# Patient Record
Sex: FEMALE | Race: WHITE | NOT HISPANIC OR LATINO | Employment: FULL TIME | URBAN - METROPOLITAN AREA
[De-identification: names, ages, dates, MRNs, and addresses within clinical notes are randomized per-mention and may not be internally consistent; named-entity substitution may affect disease eponyms.]

---

## 2017-04-26 ENCOUNTER — OFFICE VISIT (OUTPATIENT)
Dept: ENDOCRINOLOGY | Age: 32
End: 2017-04-26

## 2017-04-26 VITALS
WEIGHT: 210.6 LBS | DIASTOLIC BLOOD PRESSURE: 64 MMHG | HEART RATE: 88 BPM | HEIGHT: 64 IN | OXYGEN SATURATION: 97 % | BODY MASS INDEX: 35.96 KG/M2 | SYSTOLIC BLOOD PRESSURE: 122 MMHG

## 2017-04-26 DIAGNOSIS — R52 TOTAL BODY PAIN: ICD-10-CM

## 2017-04-26 DIAGNOSIS — E55.9 VITAMIN D DEFICIENCY: Primary | ICD-10-CM

## 2017-04-26 DIAGNOSIS — R53.82 CHRONIC FATIGUE: ICD-10-CM

## 2017-04-26 DIAGNOSIS — E06.3 HASHIMOTO'S THYROIDITIS: ICD-10-CM

## 2017-04-26 DIAGNOSIS — E55.9 VITAMIN D DEFICIENCY: ICD-10-CM

## 2017-04-26 DIAGNOSIS — Z83.3 FAMILY HISTORY OF DIABETES MELLITUS (DM): ICD-10-CM

## 2017-04-26 DIAGNOSIS — E03.9 PRIMARY HYPOTHYROIDISM: Primary | ICD-10-CM

## 2017-04-26 PROCEDURE — 99204 OFFICE O/P NEW MOD 45 MIN: CPT | Performed by: INTERNAL MEDICINE

## 2017-04-26 RX ORDER — LEVOTHYROXINE SODIUM 112 UG/1
TABLET ORAL
Refills: 5 | COMMUNITY
Start: 2017-04-10 | End: 2017-04-26

## 2017-04-26 RX ORDER — BUPROPION HYDROCHLORIDE 300 MG/1
TABLET ORAL
Refills: 2 | COMMUNITY
Start: 2017-04-10

## 2017-04-26 RX ORDER — LEVOTHYROXINE SODIUM 137 MCG
137 TABLET ORAL DAILY
Qty: 30 TABLET | Refills: 5 | Status: SHIPPED | OUTPATIENT
Start: 2017-04-26 | End: 2017-08-30 | Stop reason: SDUPTHER

## 2017-04-26 RX ORDER — BUPROPION HYDROCHLORIDE 150 MG/1
TABLET ORAL
Refills: 0 | COMMUNITY
Start: 2017-02-05 | End: 2017-04-26

## 2017-04-26 RX ORDER — BUPROPION HYDROCHLORIDE 200 MG/1
TABLET, EXTENDED RELEASE ORAL
Refills: 1 | COMMUNITY
Start: 2017-03-23 | End: 2017-04-26

## 2017-04-26 RX ORDER — LIOTHYRONINE SODIUM 5 UG/1
5 TABLET ORAL 2 TIMES DAILY
Qty: 60 TABLET | Refills: 5 | Status: SHIPPED | OUTPATIENT
Start: 2017-04-26 | End: 2017-08-30 | Stop reason: SDUPTHER

## 2017-04-26 RX ORDER — LIOTHYRONINE SODIUM 5 UG/1
TABLET ORAL
Refills: 1 | COMMUNITY
Start: 2017-04-09 | End: 2017-04-26 | Stop reason: SDUPTHER

## 2017-04-26 RX ORDER — TRAZODONE HYDROCHLORIDE 100 MG/1
TABLET ORAL
Refills: 1 | COMMUNITY
Start: 2017-04-06 | End: 2019-10-29

## 2017-04-26 NOTE — PROGRESS NOTES
Subjective this is a 32-year-old female known patient with Hashimoto thyroiditis and hypothyroidism is being seen for further evaluation and treatment of this condition.  She is currently on levothyroxin 112 µg daily and Cytomel 5 mg in the morning and 20 half milligram in the evening.  She has a very strongly positive the history of hypothyroidism and autoimmune thyroiditis in her mother and the aunt and grandmother.  She also has history of diabetes in her side of her father.  She is single and has not been pregnant by choice.  Since about 14 months ago she has lost about 40 pounds a weight primarily by adhering to an more strict diet and more intense exercise.  She is complaining of being tired primarily in the early afternoon.  She on further questioning she admits to the fact that her periods are rather irregular.  Vivian Brandt is a 32 y.o. female.     HPI Comments: New pt  Ref by dr Eris Ventura  For hashimoto's thyroiditis     Hashimoto's Thyroiditis   Associated symptoms include headaches and joint swelling ( pelvic region ).        The following portions of the patient's history were reviewed and updated as appropriate: allergies, current medications, past family history, past medical history, past social history, past surgical history and problem list.    Review of Systems   Constitutional: Negative.    Eyes: Negative.    Respiratory: Negative.    Cardiovascular: Negative.    Gastrointestinal: Negative.    Endocrine: Negative.    Genitourinary: Negative.    Musculoskeletal: Positive for joint swelling ( pelvic region ).   Skin: Negative.    Allergic/Immunologic: Negative.    Neurological: Positive for headaches.   Hematological: Negative.    Psychiatric/Behavioral: The patient is nervous/anxious.        Objective   Physical Exam   Constitutional: She is oriented to person, place, and time. She appears well-developed and well-nourished. No distress.   HENT:   Head: Normocephalic and atraumatic.   Right  Ear: External ear normal.   Left Ear: External ear normal.   Nose: Nose normal.   Mouth/Throat: Oropharynx is clear and moist. No oropharyngeal exudate.   Eyes: Conjunctivae and EOM are normal. Pupils are equal, round, and reactive to light. Right eye exhibits no discharge. Left eye exhibits no discharge. No scleral icterus.   Neck: Normal range of motion. Neck supple. No JVD present. No tracheal deviation present. No thyromegaly present.   Cardiovascular: Normal rate, regular rhythm, normal heart sounds and intact distal pulses.  Exam reveals no gallop and no friction rub.    No murmur heard.  Pulmonary/Chest: Effort normal and breath sounds normal. No stridor. No respiratory distress. She has no wheezes. She has no rales. She exhibits no tenderness.   Abdominal: Soft. Bowel sounds are normal. She exhibits no distension and no mass. There is no tenderness. There is no rebound and no guarding. No hernia.   Musculoskeletal: Normal range of motion. She exhibits no edema, tenderness or deformity.   Lymphadenopathy:     She has no cervical adenopathy.   Neurological: She is alert and oriented to person, place, and time. She has normal reflexes. She displays normal reflexes. No cranial nerve deficit. She exhibits normal muscle tone. Coordination normal.   Skin: Skin is warm and dry. No rash noted. She is not diaphoretic. No erythema. No pallor.   Psychiatric: She has a normal mood and affect. Her behavior is normal. Judgment and thought content normal.   Nursing note and vitals reviewed.    Admission on 09/29/2015, Discharged on 09/30/2015   Component Date Value Ref Range Status   • WBC 09/29/2015 12.81* 3.50 - 10.80 K/mcL Final   • RBC 09/29/2015 4.69  3.89 - 5.14 M/mcL Final   • Hemoglobin 09/29/2015 12.8  11.5 - 15.5 g/dL Final   • Hematocrit 09/29/2015 40.5  34.5 - 44.0 % Final   • MCV 09/29/2015 86.4  80.0 - 99.0 fL Final   • MCH 09/29/2015 27.3  27.0 - 31.0 pg Final   • MCHC 09/29/2015 31.6* 32.0 - 36.0 g/dL Final    • RDW-CV 09/29/2015 13.4  11.3 - 14.5 % Final   • Platelets 09/29/2015 277  150 - 450 K/mcL Final   • Neutrophils Absolute 09/29/2015 9.31* 1.50 - 8.30 K/mcL Final   • Lymphocytes Absolute 09/29/2015 2.70  0.60 - 4.80 K/mcL Final   • Monocytes Absolute 09/29/2015 0.45  0.00 - 1.00 K/mcL Final   • Eosinophils Absolute 09/29/2015 0.25  0.10 - 0.30 K/mcL Final   • Basophils Absolute 09/29/2015 0.05  0.00 - 0.20 K/mcL Final   • nRBC 09/29/2015 0.0   Final   • Neutrophil Rel % 09/29/2015 72.6* 41.0 - 71.0 % Final   • Lymphocyte Rel % 09/29/2015 21.1* 24.0 - 44.0 % Final   • Monocyte Rel % 09/29/2015 3.5  0.0 - 12.0 % Final   • Eosinophil Rel % 09/29/2015 2.0  0.0 - 3.0 % Final   • Basophil Rel % 09/29/2015 0.4  0.0 - 1.0 % Final   • Immature Granulocyte Rel % 09/29/2015 0.4  0.0 - 0.6 % Final   • TSH 09/29/2015 1.106  0.350 - 5.350 UIU/mL Final    Comment: Specimens containing fluorescein can produce falsely depressed values  with this assay.  Patients undergoing fluorescein dye angiography should  wait 72 hours post-treatment before testing.     • Glucose 09/29/2015 86  70 - 100 mg/dL Final   • BUN 09/29/2015 14  9 - 23 mg/dL Final   • Creatinine 09/29/2015 0.8  0.6 - 1.3 mg/dL Final   • Sodium 09/29/2015 140  132 - 146 mmol/L Final   • Potassium 09/29/2015 3.8  3.5 - 5.5 mmol/L Final   • Chloride 09/29/2015 106  99 - 109 mmol/L Final   • CO2 09/29/2015 16* 20 - 31 mmol/L Final   • Calcium 09/29/2015 9.2  8.7 - 10.4 mg/dL Final   • Alkaline Phosphatase 09/29/2015 99  25 - 100 Units/L Final   • AST (SGOT) 09/29/2015 15  0 - 33 Units/L Final   • ALT (SGPT) 09/29/2015 11  7 - 40 Units/L Final   • Total Bilirubin 09/29/2015 0.2* 0.3 - 1.2 mg/dL Final   • Total Protein 09/29/2015 7.8  5.7 - 8.2 g/dL Final   • Albumin 09/29/2015 4.5  3.2 - 4.8 g/dL Final   • eGFR 09/29/2015 91  ml/min/1.732 Final    Comment:    [employer] Guidelines          Stage         Description                    GFR        1                 Normal or High               90+        2                Mild decrease                 60-89        3                Moderate decrease        30-59        4                Severe decrease            15-29        5                Kidney failure                  <15         • Anion Gap 09/29/2015 18* 3 - 11 mmol/L Final     Assessment/Plan   Vivian was seen today for hashimoto's thyroiditis.    Diagnoses and all orders for this visit:    Primary hypothyroidism  -     T3, Free  -     T4 & TSH ([facility])  -     T4, Free  -     Thyroglobulin With Anti-TG  -     Uric Acid  -     Vitamin D 25 Hydroxy  -     Comprehensive Metabolic Panel  -     Follicle Stimulating Hormone  -     Lipid Panel  -     Luteinizing Hormone  -     Prolactin  -     ACTH  -     Cortisol  -     C-Peptide  -     Hemoglobin A1c  -     T3, Free; Future  -     T4 & TSH ([facility]); Future  -     T4, Free; Future  -     Thyroglobulin With Anti-TG; Future  -     Comprehensive Metabolic Panel; Future  -     Vitamin D 25 Hydroxy; Future  -     Uric Acid; Future  -     Lipid Panel; Future    Hashimoto's thyroiditis  -     T3, Free  -     T4 & TSH ([facility])  -     T4, Free  -     Thyroglobulin With Anti-TG  -     Uric Acid  -     Vitamin D 25 Hydroxy  -     Comprehensive Metabolic Panel  -     Follicle Stimulating Hormone  -     Lipid Panel  -     Luteinizing Hormone  -     Prolactin  -     ACTH  -     Cortisol  -     C-Peptide  -     Hemoglobin A1c  -     T3, Free; Future  -     T4 & TSH ([facility]); Future  -     T4, Free; Future  -     Thyroglobulin With Anti-TG; Future  -     Comprehensive Metabolic Panel; Future  -     Vitamin D 25 Hydroxy; Future  -     Uric Acid; Future  -     Lipid Panel; Future    Chronic fatigue  -     T3, Free  -     T4 & TSH ([facility])  -     T4, Free  -     Thyroglobulin With Anti-TG  -     Uric Acid  -     Vitamin D 25 Hydroxy  -     Comprehensive Metabolic Panel  -     Follicle Stimulating Hormone  -     Lipid Panel  -      Luteinizing Hormone  -     Prolactin  -     ACTH  -     Cortisol  -     C-Peptide  -     Hemoglobin A1c  -     T3, Free; Future  -     T4 & TSH ([facility]); Future  -     T4, Free; Future  -     Thyroglobulin With Anti-TG; Future  -     Comprehensive Metabolic Panel; Future  -     Vitamin D 25 Hydroxy; Future  -     Uric Acid; Future  -     Lipid Panel; Future    Total body pain  -     T3, Free  -     T4 & TSH ([facility])  -     T4, Free  -     Thyroglobulin With Anti-TG  -     Uric Acid  -     Vitamin D 25 Hydroxy  -     Comprehensive Metabolic Panel  -     Follicle Stimulating Hormone  -     Lipid Panel  -     Luteinizing Hormone  -     Prolactin  -     ACTH  -     Cortisol  -     C-Peptide  -     Hemoglobin A1c  -     T3, Free; Future  -     T4 & TSH ([facility]); Future  -     T4, Free; Future  -     Thyroglobulin With Anti-TG; Future  -     Comprehensive Metabolic Panel; Future  -     Vitamin D 25 Hydroxy; Future  -     Uric Acid; Future  -     Lipid Panel; Future    Vitamin D deficiency  -     T3, Free  -     T4 & TSH ([facility])  -     T4, Free  -     Thyroglobulin With Anti-TG  -     Uric Acid  -     Vitamin D 25 Hydroxy  -     Comprehensive Metabolic Panel  -     Follicle Stimulating Hormone  -     Lipid Panel  -     Luteinizing Hormone  -     Prolactin  -     ACTH  -     Cortisol  -     C-Peptide  -     Hemoglobin A1c  -     T3, Free; Future  -     T4 & TSH ([facility]); Future  -     T4, Free; Future  -     Thyroglobulin With Anti-TG; Future  -     Comprehensive Metabolic Panel; Future  -     Vitamin D 25 Hydroxy; Future  -     Uric Acid; Future  -     Lipid Panel; Future    Family history of diabetes mellitus (DM)  -     C-Peptide  -     Hemoglobin A1c  -     T3, Free; Future  -     T4 & TSH ([facility]); Future  -     T4, Free; Future  -     Thyroglobulin With Anti-TG; Future  -     Comprehensive Metabolic Panel; Future  -     Vitamin D 25 Hydroxy; Future  -     Uric Acid; Future  -     Lipid Panel; Future    Other  orders  -     SYNTHROID 137 MCG tablet; Take 1 tablet by mouth Daily. On empty stomach  -     liothyronine (CYTOMEL) 5 MCG tablet; Take 1 tablet by mouth 2 (Two) Times a Day.             in summary I saw and examined this 32-year-old female for above-mentioned problems.  I reviewed her primary care's office notes as well as laboratory evaluation of 08/05/2016 and at this point we will go ahead and the change her to Synthroid 137 µg per day with Cytomel 5 mg twice daily first dose to be taken in the morning and the second dose in early afternoon around 3-4 PM.  Additionally we will go ahead and order an extensive laboratory evaluation and once the results come back we will go ahead and call for any possible modifications.  She will see Ms. Lydia Haddad in 4 months or sooner if needed with laboratory evaluation prior to each office visit.

## 2017-04-27 LAB — 25(OH)D3+25(OH)D2 SERPL-MCNC: 34.3 NG/ML (ref 30–100)

## 2017-05-02 LAB
ACTH PLAS-MCNC: 25.2 PG/ML (ref 7.2–63.3)
ALBUMIN SERPL-MCNC: 4.3 G/DL (ref 3.5–5.2)
ALBUMIN/GLOB SERPL: 1.3 G/DL
ALP SERPL-CCNC: 88 U/L (ref 39–117)
ALT SERPL-CCNC: 9 U/L (ref 1–33)
AST SERPL-CCNC: 11 U/L (ref 1–32)
BILIRUB SERPL-MCNC: 0.2 MG/DL (ref 0.1–1.2)
BUN SERPL-MCNC: 15 MG/DL (ref 6–20)
BUN/CREAT SERPL: 16.7 (ref 7–25)
C PEPTIDE SERPL-MCNC: 6.5 NG/ML (ref 1.1–4.4)
CALCIUM SERPL-MCNC: 9.9 MG/DL (ref 8.6–10.5)
CHLORIDE SERPL-SCNC: 101 MMOL/L (ref 98–107)
CHOLEST SERPL-MCNC: 161 MG/DL (ref 0–200)
CO2 SERPL-SCNC: 24.5 MMOL/L (ref 22–29)
CORTIS SERPL-MCNC: 9.4 UG/DL
CREAT SERPL-MCNC: 0.9 MG/DL (ref 0.57–1)
FSH SERPL-ACNC: 7.5 MIU/ML
GLOBULIN SER CALC-MCNC: 3.2 GM/DL
GLUCOSE SERPL-MCNC: 88 MG/DL (ref 65–99)
HBA1C MFR BLD: 5 % (ref 4.8–5.6)
HDLC SERPL-MCNC: 31 MG/DL (ref 40–60)
LDLC SERPL CALC-MCNC: 101 MG/DL (ref 0–100)
LH SERPL-ACNC: 5.3 MIU/ML
POTASSIUM SERPL-SCNC: 4.3 MMOL/L (ref 3.5–5.2)
PROLACTIN SERPL-MCNC: 15.8 NG/ML (ref 4.8–23.3)
PROT SERPL-MCNC: 7.5 G/DL (ref 6–8.5)
SODIUM SERPL-SCNC: 141 MMOL/L (ref 136–145)
T3FREE SERPL-MCNC: 3.4 PG/ML (ref 2–4.4)
T4 FREE SERPL-MCNC: 1.52 NG/DL (ref 0.93–1.7)
T4 SERPL-MCNC: 9.71 MCG/DL (ref 4.5–11.7)
THYROGLOB AB SERPL-ACNC: 16.8 IU/ML (ref 0–0.9)
THYROGLOB SERPL-MCNC: 2.6 NG/ML
TRIGL SERPL-MCNC: 147 MG/DL (ref 0–150)
TSH SERPL DL<=0.005 MIU/L-ACNC: 1.07 MIU/ML (ref 0.27–4.2)
URATE SERPL-MCNC: 5.6 MG/DL (ref 2.4–5.7)
VLDLC SERPL CALC-MCNC: 29.4 MG/DL (ref 5–40)

## 2017-08-16 ENCOUNTER — RESULTS ENCOUNTER (OUTPATIENT)
Dept: ENDOCRINOLOGY | Age: 32
End: 2017-08-16

## 2017-08-16 DIAGNOSIS — R52 TOTAL BODY PAIN: ICD-10-CM

## 2017-08-16 DIAGNOSIS — E06.3 HASHIMOTO'S THYROIDITIS: ICD-10-CM

## 2017-08-16 DIAGNOSIS — R53.82 CHRONIC FATIGUE: ICD-10-CM

## 2017-08-16 DIAGNOSIS — E55.9 VITAMIN D DEFICIENCY: ICD-10-CM

## 2017-08-16 DIAGNOSIS — Z83.3 FAMILY HISTORY OF DIABETES MELLITUS (DM): ICD-10-CM

## 2017-08-16 DIAGNOSIS — E03.9 PRIMARY HYPOTHYROIDISM: ICD-10-CM

## 2017-08-30 ENCOUNTER — OFFICE VISIT (OUTPATIENT)
Dept: ENDOCRINOLOGY | Age: 32
End: 2017-08-30

## 2017-08-30 VITALS
SYSTOLIC BLOOD PRESSURE: 112 MMHG | WEIGHT: 193 LBS | BODY MASS INDEX: 32.95 KG/M2 | DIASTOLIC BLOOD PRESSURE: 72 MMHG | HEIGHT: 64 IN

## 2017-08-30 DIAGNOSIS — G47.00 INSOMNIA, UNSPECIFIED TYPE: ICD-10-CM

## 2017-08-30 DIAGNOSIS — E03.9 PRIMARY HYPOTHYROIDISM: ICD-10-CM

## 2017-08-30 DIAGNOSIS — E55.9 VITAMIN D DEFICIENCY: ICD-10-CM

## 2017-08-30 DIAGNOSIS — Z83.3 FAMILY HISTORY OF DIABETES MELLITUS (DM): ICD-10-CM

## 2017-08-30 DIAGNOSIS — E06.3 HASHIMOTO'S THYROIDITIS: Primary | ICD-10-CM

## 2017-08-30 PROCEDURE — 99214 OFFICE O/P EST MOD 30 MIN: CPT | Performed by: NURSE PRACTITIONER

## 2017-08-30 RX ORDER — LEVOTHYROXINE SODIUM 137 MCG
137 TABLET ORAL DAILY
Qty: 30 TABLET | Refills: 5 | Status: SHIPPED | OUTPATIENT
Start: 2017-08-30 | End: 2018-03-02 | Stop reason: SDUPTHER

## 2017-08-30 RX ORDER — LIOTHYRONINE SODIUM 5 UG/1
5 TABLET ORAL 2 TIMES DAILY
Qty: 60 TABLET | Refills: 5 | Status: SHIPPED | OUTPATIENT
Start: 2017-08-30 | End: 2018-04-18 | Stop reason: SDUPTHER

## 2017-08-30 RX ORDER — ERGOCALCIFEROL 1.25 MG/1
50000 CAPSULE ORAL WEEKLY
Qty: 12 CAPSULE | Refills: 3 | Status: SHIPPED | OUTPATIENT
Start: 2017-08-30 | End: 2017-12-27 | Stop reason: SDUPTHER

## 2017-08-30 NOTE — PROGRESS NOTES
Subjective   Vivian Brandt is a 32 y.o. female is here today for follow-up.    Hypothyroidism   This is a chronic problem. The current episode started more than 1 month ago. The problem occurs constantly. Pertinent negatives include no chest pain, coughing, fatigue, headaches, joint swelling, myalgias, nausea, rash or weakness. Nothing aggravates the symptoms. Treatments tried: medications. The treatment provided no relief.   Insomnia   This is a chronic problem. The current episode started more than 1 year ago. The problem occurs constantly. The problem has been unchanged. Pertinent negatives include no chest pain, coughing, fatigue, headaches, joint swelling, myalgias, nausea, rash or weakness. Nothing aggravates the symptoms. Treatments tried: medication - trazadone. The treatment provided no relief.       The following portions of the patient's history were reviewed and updated as appropriate: current medications, past family history, past medical history, past social history, past surgical history and problem list.       Current Outpatient Prescriptions:   •  buPROPion XL (WELLBUTRIN XL) 300 MG 24 hr tablet, TAKE 1 TABLET BY MOUTH EVERY DAY, Disp: , Rfl: 2  •  liothyronine (CYTOMEL) 5 MCG tablet, Take 1 tablet by mouth 2 (Two) Times a Day., Disp: 60 tablet, Rfl: 5  •  SYNTHROID 137 MCG tablet, Take 1 tablet by mouth Daily. On empty stomach, Disp: 30 tablet, Rfl: 5  •  traZODone (DESYREL) 100 MG tablet, TAKE 1 1/2 tablet at night, Disp: , Rfl: 1  •  vitamin D (ERGOCALCIFEROL) 66470 units capsule capsule, Take 1 capsule by mouth 1 (One) Time Per Week., Disp: 12 capsule, Rfl: 3     Patient Active Problem List   Diagnosis   • Vitamin D deficiency   • Total body pain   • Hashimoto's thyroiditis   • Chronic fatigue   • Primary hypothyroidism   • Family history of diabetes mellitus (DM)   • Insomnia       Review of Systems   Constitutional: Negative for activity change, appetite change, fatigue and unexpected weight  change.   HENT: Negative for trouble swallowing and voice change.    Eyes: Negative for visual disturbance.   Respiratory: Negative for apnea, cough, choking, chest tightness, shortness of breath and wheezing.    Cardiovascular: Negative for chest pain, palpitations and leg swelling.   Gastrointestinal: Negative for abdominal distention, constipation, diarrhea and nausea.   Endocrine: Negative for cold intolerance, heat intolerance, polydipsia, polyphagia and polyuria.   Genitourinary: Negative for frequency and menstrual problem.   Musculoskeletal: Negative for gait problem, joint swelling and myalgias.   Skin: Negative for color change, pallor and rash.   Allergic/Immunologic: Negative.    Neurological: Negative for tremors, weakness and headaches.   Hematological: Bruises/bleeds easily.   Psychiatric/Behavioral: Positive for sleep disturbance. Negative for agitation and dysphoric mood. The patient is nervous/anxious and has insomnia.      Results Encounter on 08/16/2017   Component Date Value Ref Range Status   • T3, Free 08/23/2017 3.8  2.0 - 4.4 pg/mL Final   • TSH 08/23/2017 0.218* 0.270 - 4.200 mIU/mL Final   • T4, Total 08/23/2017 11.17  4.50 - 11.70 mcg/dL Final   • Free T4 08/23/2017 1.81* 0.93 - 1.70 ng/dL Final   • Glucose 08/23/2017 91  65 - 99 mg/dL Final   • BUN 08/23/2017 14  6 - 20 mg/dL Final   • Creatinine 08/23/2017 0.96  0.57 - 1.00 mg/dL Final   • eGFR Non  Am 08/23/2017 67  >60 mL/min/1.73 Final   • eGFR African Am 08/23/2017 82  >60 mL/min/1.73 Final   • BUN/Creatinine Ratio 08/23/2017 14.6  7.0 - 25.0 Final   • Sodium 08/23/2017 139  136 - 145 mmol/L Final   • Potassium 08/23/2017 4.3  3.5 - 5.2 mmol/L Final   • Chloride 08/23/2017 102  98 - 107 mmol/L Final   • Total CO2 08/23/2017 22.8  22.0 - 29.0 mmol/L Final   • Calcium 08/23/2017 9.3  8.6 - 10.5 mg/dL Final   • Total Protein 08/23/2017 7.0  6.0 - 8.5 g/dL Final   • Albumin 08/23/2017 4.70  3.50 - 5.20 g/dL Final   • Globulin  08/23/2017 2.3  gm/dL Final   • A/G Ratio 08/23/2017 2.0  g/dL Final   • Total Bilirubin 08/23/2017 0.2  0.1 - 1.2 mg/dL Final   • Alkaline Phosphatase 08/23/2017 77  39 - 117 U/L Final   • AST (SGOT) 08/23/2017 7  1 - 32 U/L Final   • ALT (SGPT) 08/23/2017 8  1 - 33 U/L Final   • 25 Hydroxy, Vitamin D 08/23/2017 19.4* 30.0 - 100.0 ng/mL Final    Comment: Reference Range for Total Vitamin D 25(OH)  Deficiency    <20.0 ng/mL  Insufficiency 21-29 ng/mL  Sufficiency    ng/mL  Toxicity      >100 ng/ml        • Uric Acid 08/23/2017 4.9  2.4 - 5.7 mg/dL Final   • Total Cholesterol 08/23/2017 137  0 - 200 mg/dL Final   • Triglycerides 08/23/2017 69  0 - 150 mg/dL Final   • HDL Cholesterol 08/23/2017 36* 40 - 60 mg/dL Final   • VLDL Cholesterol 08/23/2017 13.8  5 - 40 mg/dL Final   • LDL Cholesterol  08/23/2017 87  0 - 100 mg/dL Final     Wt Readings from Last 3 Encounters:   08/30/17 193 lb (87.5 kg)   04/26/17 210 lb 9.6 oz (95.5 kg)     Temp Readings from Last 3 Encounters:   No data found for Temp     BP Readings from Last 3 Encounters:   08/30/17 112/72   04/26/17 122/64     Pulse Readings from Last 3 Encounters:   04/26/17 88       Objective   Physical Exam   Constitutional: She is oriented to person, place, and time. Vital signs are normal. She appears well-developed and well-nourished. She is sleeping, active and cooperative.   HENT:   Head: Atraumatic.   Eyes: EOM are normal.   Neck: Normal range of motion. Neck supple. No tracheal tenderness present. Carotid bruit is not present. No tracheal deviation present. No thyroid mass and no thyromegaly present.   Cardiovascular: Normal rate, regular rhythm, S1 normal, S2 normal and normal heart sounds.  Exam reveals no gallop.    No murmur heard.  Pulmonary/Chest: Effort normal and breath sounds normal. No accessory muscle usage. No respiratory distress.   Abdominal: Soft. Normal appearance and bowel sounds are normal. There is no tenderness.   Neurological: She is  alert and oriented to person, place, and time. She has normal strength. Gait normal.   Skin: Skin is warm, dry and intact. There is pallor.   Psychiatric: She has a normal mood and affect. Her speech is normal and behavior is normal. Judgment and thought content normal. Her mood appears not anxious. Her affect is not blunt. Cognition and memory are normal. She does not exhibit a depressed mood. She is attentive.   Nursing note and vitals reviewed.        Assessment/Plan   Vivian was seen today for follow-up.    Diagnoses and all orders for this visit:    Hashimoto's thyroiditis  -     Comprehensive Metabolic Panel; Future    Primary hypothyroidism  -     liothyronine (CYTOMEL) 5 MCG tablet; Take 1 tablet by mouth 2 (Two) Times a Day.  -     SYNTHROID 137 MCG tablet; Take 1 tablet by mouth Daily. On empty stomach  -     Comprehensive Metabolic Panel; Future  -     Lipid Panel; Future  -     Uric Acid; Future  -     TSH; Future  -     T4, Free; Future  -     T4; Future  -     T3, Free; Future  -     T3; Future    Vitamin D deficiency  -     vitamin D (ERGOCALCIFEROL) 28590 units capsule capsule; Take 1 capsule by mouth 1 (One) Time Per Week.  -     Comprehensive Metabolic Panel; Future  -     Vitamin D 25 Hydroxy; Future    Family history of diabetes mellitus (DM)  -     Comprehensive Metabolic Panel; Future    Insomnia, unspecified type  -     Comprehensive Metabolic Panel; Future          In summary:  Patient was seen today is metabolically stable with her thyroid panel.  Patient did report insomnia problems chronic.  Patient did suggest over-the-counter melatonin 5 mg at first can titrate up to 10 mg if tolerating.  Canceled on vitamin D deficiency and medication.    Face-to-face 25 minutes with 13 minutes and education.  Discussed labs and diagnosis of vitamin D and future lab and appointment needed.        Return in about 3 months (around 11/30/2017), or if symptoms worsen or fail to improve. 3 months with  Lydia-1 week prior for labs 6 months with Dr. Brown-one week prior for labs

## 2017-09-04 LAB
25(OH)D3+25(OH)D2 SERPL-MCNC: 19.4 NG/ML (ref 30–100)
ALBUMIN SERPL-MCNC: 4.7 G/DL (ref 3.5–5.2)
ALBUMIN/GLOB SERPL: 2 G/DL
ALP SERPL-CCNC: 77 U/L (ref 39–117)
ALT SERPL-CCNC: 8 U/L (ref 1–33)
AST SERPL-CCNC: 7 U/L (ref 1–32)
BILIRUB SERPL-MCNC: 0.2 MG/DL (ref 0.1–1.2)
BUN SERPL-MCNC: 14 MG/DL (ref 6–20)
BUN/CREAT SERPL: 14.6 (ref 7–25)
CALCIUM SERPL-MCNC: 9.3 MG/DL (ref 8.6–10.5)
CHLORIDE SERPL-SCNC: 102 MMOL/L (ref 98–107)
CHOLEST SERPL-MCNC: 137 MG/DL (ref 0–200)
CO2 SERPL-SCNC: 22.8 MMOL/L (ref 22–29)
CREAT SERPL-MCNC: 0.96 MG/DL (ref 0.57–1)
GLOBULIN SER CALC-MCNC: 2.3 GM/DL
GLUCOSE SERPL-MCNC: 91 MG/DL (ref 65–99)
HDLC SERPL-MCNC: 36 MG/DL (ref 40–60)
LDLC SERPL CALC-MCNC: 87 MG/DL (ref 0–100)
POTASSIUM SERPL-SCNC: 4.3 MMOL/L (ref 3.5–5.2)
PROT SERPL-MCNC: 7 G/DL (ref 6–8.5)
SODIUM SERPL-SCNC: 139 MMOL/L (ref 136–145)
T3FREE SERPL-MCNC: 3.8 PG/ML (ref 2–4.4)
T4 FREE SERPL-MCNC: 1.81 NG/DL (ref 0.93–1.7)
T4 SERPL-MCNC: 11.17 MCG/DL (ref 4.5–11.7)
THYROGLOB AB SERPL-ACNC: 9.4 IU/ML (ref 0–0.9)
THYROGLOB SERPL-MCNC: 2.8 NG/ML
TRIGL SERPL-MCNC: 69 MG/DL (ref 0–150)
TSH SERPL DL<=0.005 MIU/L-ACNC: 0.22 MIU/ML (ref 0.27–4.2)
URATE SERPL-MCNC: 4.9 MG/DL (ref 2.4–5.7)
VLDLC SERPL CALC-MCNC: 13.8 MG/DL (ref 5–40)

## 2017-11-30 ENCOUNTER — RESULTS ENCOUNTER (OUTPATIENT)
Dept: ENDOCRINOLOGY | Age: 32
End: 2017-11-30

## 2017-11-30 DIAGNOSIS — G47.00 INSOMNIA, UNSPECIFIED TYPE: ICD-10-CM

## 2017-11-30 DIAGNOSIS — E55.9 VITAMIN D DEFICIENCY: ICD-10-CM

## 2017-11-30 DIAGNOSIS — Z83.3 FAMILY HISTORY OF DIABETES MELLITUS (DM): ICD-10-CM

## 2017-11-30 DIAGNOSIS — E06.3 HASHIMOTO'S THYROIDITIS: ICD-10-CM

## 2017-11-30 DIAGNOSIS — E03.9 PRIMARY HYPOTHYROIDISM: ICD-10-CM

## 2017-12-14 ENCOUNTER — OFFICE VISIT (OUTPATIENT)
Dept: ENDOCRINOLOGY | Age: 32
End: 2017-12-14

## 2017-12-14 VITALS
SYSTOLIC BLOOD PRESSURE: 122 MMHG | BODY MASS INDEX: 33.97 KG/M2 | HEIGHT: 64 IN | WEIGHT: 199 LBS | DIASTOLIC BLOOD PRESSURE: 80 MMHG

## 2017-12-14 DIAGNOSIS — E03.9 PRIMARY HYPOTHYROIDISM: ICD-10-CM

## 2017-12-14 DIAGNOSIS — R53.82 CHRONIC FATIGUE: ICD-10-CM

## 2017-12-14 DIAGNOSIS — G47.00 INSOMNIA, UNSPECIFIED TYPE: ICD-10-CM

## 2017-12-14 DIAGNOSIS — E55.9 VITAMIN D DEFICIENCY: ICD-10-CM

## 2017-12-14 DIAGNOSIS — E06.3 HASHIMOTO'S THYROIDITIS: Primary | ICD-10-CM

## 2017-12-14 PROCEDURE — 99213 OFFICE O/P EST LOW 20 MIN: CPT | Performed by: NURSE PRACTITIONER

## 2017-12-14 NOTE — PROGRESS NOTES
Subjective   Vivian Brandt is a 32 y.o. female is here today for follow-up.    Hashimoto's Thyroiditis   This is a chronic problem. The current episode started more than 1 month ago. Associated symptoms include myalgias. Pertinent negatives include no fatigue or rash. Nothing aggravates the symptoms. She has tried nothing for the symptoms. The treatment provided no relief.   Insomnia   This is a chronic problem. The current episode started more than 1 month ago. The problem occurs constantly. The problem has been gradually improving. Associated symptoms include myalgias. Pertinent negatives include no fatigue or rash. Nothing aggravates the symptoms. Treatments tried: OTC melatonin. The treatment provided significant relief.       The following portions of the patient's history were reviewed and updated as appropriate: current medications, past family history, past medical history, past social history, past surgical history and problem list.       Current Outpatient Prescriptions:   •  buPROPion XL (WELLBUTRIN XL) 300 MG 24 hr tablet, TAKE 1 TABLET BY MOUTH EVERY DAY, Disp: , Rfl: 2  •  liothyronine (CYTOMEL) 5 MCG tablet, Take 1 tablet by mouth 2 (Two) Times a Day., Disp: 60 tablet, Rfl: 5  •  SYNTHROID 137 MCG tablet, Take 1 tablet by mouth Daily. On empty stomach, Disp: 30 tablet, Rfl: 5  •  traZODone (DESYREL) 100 MG tablet, TAKE 1 1/2 tablet at night, Disp: , Rfl: 1  •  vitamin D (ERGOCALCIFEROL) 64104 units capsule capsule, Take 1 capsule by mouth 1 (One) Time Per Week., Disp: 12 capsule, Rfl: 3     Patient Active Problem List   Diagnosis   • Vitamin D deficiency   • Total body pain   • Hashimoto's thyroiditis   • Chronic fatigue   • Primary hypothyroidism   • Family history of diabetes mellitus (DM)   • Insomnia       Review of Systems   Constitutional: Negative for fatigue.   HENT: Negative for trouble swallowing.    Eyes: Negative for visual disturbance.   Respiratory: Negative for choking.     Cardiovascular: Negative for palpitations.   Gastrointestinal: Negative for constipation.   Endocrine: Negative for cold intolerance.   Genitourinary: Negative for menstrual problem.   Musculoskeletal: Positive for myalgias.   Skin: Negative for rash.   Allergic/Immunologic: Negative.    Neurological: Negative for light-headedness.   Hematological: Bruises/bleeds easily.   Psychiatric/Behavioral: The patient is nervous/anxious and has insomnia.    All other systems reviewed and are negative.      Current Outpatient Prescriptions:   •  buPROPion XL (WELLBUTRIN XL) 300 MG 24 hr tablet, TAKE 1 TABLET BY MOUTH EVERY DAY, Disp: , Rfl: 2  •  liothyronine (CYTOMEL) 5 MCG tablet, Take 1 tablet by mouth 2 (Two) Times a Day., Disp: 60 tablet, Rfl: 5  •  SYNTHROID 137 MCG tablet, Take 1 tablet by mouth Daily. On empty stomach, Disp: 30 tablet, Rfl: 5  •  traZODone (DESYREL) 100 MG tablet, TAKE 1 1/2 tablet at night, Disp: , Rfl: 1  •  vitamin D (ERGOCALCIFEROL) 78956 units capsule capsule, Take 1 capsule by mouth 1 (One) Time Per Week., Disp: 12 capsule, Rfl: 3       Objective   Physical Exam   Constitutional: She is oriented to person, place, and time. Vital signs are normal. She appears well-developed and well-nourished. She is sleeping, active and cooperative.   HENT:   Head: Atraumatic.   Eyes: EOM are normal.   Neck: Normal range of motion. Neck supple. No tracheal tenderness present. Carotid bruit is not present. No tracheal deviation present. No thyroid mass and no thyromegaly present.   Cardiovascular: Normal rate, regular rhythm, S1 normal, S2 normal and normal heart sounds.  Exam reveals no gallop.    No murmur heard.  Pulmonary/Chest: Effort normal and breath sounds normal. No accessory muscle usage. No respiratory distress.   Abdominal: Soft. Normal appearance and bowel sounds are normal. There is no tenderness.   Neurological: She is alert and oriented to person, place, and time. She has normal strength and  normal reflexes. Gait normal.   Skin: Skin is warm, dry and intact. There is pallor.   Psychiatric: She has a normal mood and affect. Her speech is normal and behavior is normal. Judgment and thought content normal. Her mood appears not anxious. Her affect is not blunt. Cognition and memory are normal. She does not exhibit a depressed mood. She is attentive.   Nursing note and vitals reviewed.        Assessment/Plan   Vivian was seen today for follow-up.    Diagnoses and all orders for this visit:    Hashimoto's thyroiditis  -     Comprehensive Metabolic Panel  -     Lipid Panel  -     Uric Acid  -     Vitamin D 25 Hydroxy  -     TSH  -     T4, Free  -     T3, Free    Primary hypothyroidism  -     Comprehensive Metabolic Panel  -     Lipid Panel  -     Uric Acid  -     Vitamin D 25 Hydroxy  -     TSH  -     T4, Free  -     T3, Free    Vitamin D deficiency  -     Comprehensive Metabolic Panel  -     Lipid Panel  -     Uric Acid  -     Vitamin D 25 Hydroxy  -     TSH  -     T4, Free  -     T3, Free    Insomnia, unspecified type  -     Comprehensive Metabolic Panel  -     Lipid Panel  -     Uric Acid  -     Vitamin D 25 Hydroxy  -     TSH  -     T4, Free  -     T3, Free    Chronic fatigue  -     Comprehensive Metabolic Panel  -     Lipid Panel  -     Uric Acid  -     Vitamin D 25 Hydroxy  -     TSH  -     T4, Free  -     T3, Free           In Summary: I met with patient today who is metabolically stable and doing well.  Patient states that she tried over-the-counter melatonin as previously advised or suggested her this provider for her insomnia and it has worked very well.  She has no other complaints at this time.  Labs will be obtained today and treat as indicated as results.  No other medications will be changed.      Return in about 4 months (around 4/14/2018), or if symptoms worsen or fail to improve, for Recheck. 4 Follow-up with Lydia-1 week prior for labs 8 months with Dr. Brown-one week walk prior for labs

## 2017-12-15 LAB
25(OH)D3+25(OH)D2 SERPL-MCNC: 26 NG/ML (ref 30–100)
ALBUMIN SERPL-MCNC: 4.2 G/DL (ref 3.5–5.2)
ALBUMIN/GLOB SERPL: 1.6 G/DL
ALP SERPL-CCNC: 79 U/L (ref 39–117)
ALT SERPL-CCNC: 8 U/L (ref 1–33)
AST SERPL-CCNC: 9 U/L (ref 1–32)
BILIRUB SERPL-MCNC: 0.3 MG/DL (ref 0.1–1.2)
BUN SERPL-MCNC: 16 MG/DL (ref 6–20)
BUN/CREAT SERPL: 19 (ref 7–25)
CALCIUM SERPL-MCNC: 9.1 MG/DL (ref 8.6–10.5)
CHLORIDE SERPL-SCNC: 102 MMOL/L (ref 98–107)
CHOLEST SERPL-MCNC: 163 MG/DL (ref 0–200)
CO2 SERPL-SCNC: 25.5 MMOL/L (ref 22–29)
CREAT SERPL-MCNC: 0.84 MG/DL (ref 0.57–1)
GFR SERPLBLD CREATININE-BSD FMLA CKD-EPI: 79 ML/MIN/1.73
GFR SERPLBLD CREATININE-BSD FMLA CKD-EPI: 95 ML/MIN/1.73
GLOBULIN SER CALC-MCNC: 2.7 GM/DL
GLUCOSE SERPL-MCNC: 77 MG/DL (ref 65–99)
HDLC SERPL-MCNC: 60 MG/DL (ref 40–60)
INTERPRETATION: NORMAL
LDLC SERPL CALC-MCNC: 93 MG/DL (ref 0–100)
POTASSIUM SERPL-SCNC: 4.5 MMOL/L (ref 3.5–5.2)
PROT SERPL-MCNC: 6.9 G/DL (ref 6–8.5)
SODIUM SERPL-SCNC: 143 MMOL/L (ref 136–145)
T3FREE SERPL-MCNC: 3.4 PG/ML (ref 2–4.4)
T4 FREE SERPL-MCNC: 1.67 NG/DL (ref 0.93–1.7)
TRIGL SERPL-MCNC: 51 MG/DL (ref 0–150)
TSH SERPL DL<=0.005 MIU/L-ACNC: 0.58 MIU/ML (ref 0.27–4.2)
URATE SERPL-MCNC: 5.2 MG/DL (ref 2.4–5.7)
VLDLC SERPL CALC-MCNC: 10.2 MG/DL (ref 5–40)

## 2017-12-27 DIAGNOSIS — E55.9 VITAMIN D DEFICIENCY: ICD-10-CM

## 2017-12-27 RX ORDER — ERGOCALCIFEROL 1.25 MG/1
50000 CAPSULE ORAL WEEKLY
Qty: 24 CAPSULE | Refills: 3 | Status: SHIPPED | OUTPATIENT
Start: 2017-12-27 | End: 2018-04-18 | Stop reason: SDUPTHER

## 2018-03-02 DIAGNOSIS — E03.9 PRIMARY HYPOTHYROIDISM: ICD-10-CM

## 2018-03-02 RX ORDER — LEVOTHYROXINE SODIUM 137 MCG
TABLET ORAL
Qty: 30 TABLET | Refills: 3 | Status: SHIPPED | OUTPATIENT
Start: 2018-03-02 | End: 2018-04-18 | Stop reason: SDUPTHER

## 2018-04-12 LAB
25(OH)D3+25(OH)D2 SERPL-MCNC: 50.3 NG/ML (ref 30–100)
ALBUMIN SERPL-MCNC: 4.1 G/DL (ref 3.5–5.2)
ALBUMIN/GLOB SERPL: 1.6 G/DL
ALP SERPL-CCNC: 71 U/L (ref 39–117)
ALT SERPL-CCNC: 8 U/L (ref 1–33)
AST SERPL-CCNC: 12 U/L (ref 1–32)
BILIRUB SERPL-MCNC: 0.2 MG/DL (ref 0.1–1.2)
BUN SERPL-MCNC: 11 MG/DL (ref 6–20)
BUN/CREAT SERPL: 13.3 (ref 7–25)
CALCIUM SERPL-MCNC: 9.2 MG/DL (ref 8.6–10.5)
CHLORIDE SERPL-SCNC: 102 MMOL/L (ref 98–107)
CHOLEST SERPL-MCNC: 143 MG/DL (ref 0–200)
CO2 SERPL-SCNC: 25.5 MMOL/L (ref 22–29)
CREAT SERPL-MCNC: 0.83 MG/DL (ref 0.57–1)
GFR SERPLBLD CREATININE-BSD FMLA CKD-EPI: 79 ML/MIN/1.73
GFR SERPLBLD CREATININE-BSD FMLA CKD-EPI: 96 ML/MIN/1.73
GLOBULIN SER CALC-MCNC: 2.5 GM/DL
GLUCOSE SERPL-MCNC: 90 MG/DL (ref 65–99)
HDLC SERPL-MCNC: 43 MG/DL (ref 40–60)
INTERPRETATION: NORMAL
LDLC SERPL CALC-MCNC: 88 MG/DL (ref 0–100)
POTASSIUM SERPL-SCNC: 4.5 MMOL/L (ref 3.5–5.2)
PROT SERPL-MCNC: 6.6 G/DL (ref 6–8.5)
SODIUM SERPL-SCNC: 140 MMOL/L (ref 136–145)
T3FREE SERPL-MCNC: 3.8 PG/ML (ref 2–4.4)
T4 FREE SERPL-MCNC: 1.59 NG/DL (ref 0.93–1.7)
TRIGL SERPL-MCNC: 61 MG/DL (ref 0–150)
TSH SERPL DL<=0.005 MIU/L-ACNC: 0.12 MIU/ML (ref 0.27–4.2)
URATE SERPL-MCNC: 4.6 MG/DL (ref 2.4–5.7)
VLDLC SERPL CALC-MCNC: 12.2 MG/DL (ref 5–40)

## 2018-04-14 ENCOUNTER — RESULTS ENCOUNTER (OUTPATIENT)
Dept: ENDOCRINOLOGY | Age: 33
End: 2018-04-14

## 2018-04-14 DIAGNOSIS — E55.9 VITAMIN D DEFICIENCY: ICD-10-CM

## 2018-04-14 DIAGNOSIS — E06.3 HASHIMOTO'S THYROIDITIS: ICD-10-CM

## 2018-04-14 DIAGNOSIS — E03.9 PRIMARY HYPOTHYROIDISM: ICD-10-CM

## 2018-04-14 DIAGNOSIS — R53.82 CHRONIC FATIGUE: ICD-10-CM

## 2018-04-14 DIAGNOSIS — G47.00 INSOMNIA, UNSPECIFIED TYPE: ICD-10-CM

## 2018-04-18 ENCOUNTER — OFFICE VISIT (OUTPATIENT)
Dept: ENDOCRINOLOGY | Age: 33
End: 2018-04-18

## 2018-04-18 VITALS
BODY MASS INDEX: 35 KG/M2 | HEIGHT: 64 IN | DIASTOLIC BLOOD PRESSURE: 76 MMHG | WEIGHT: 205 LBS | SYSTOLIC BLOOD PRESSURE: 112 MMHG

## 2018-04-18 DIAGNOSIS — R53.82 CHRONIC FATIGUE: ICD-10-CM

## 2018-04-18 DIAGNOSIS — E06.3 HASHIMOTO'S THYROIDITIS: ICD-10-CM

## 2018-04-18 DIAGNOSIS — E55.9 VITAMIN D DEFICIENCY: ICD-10-CM

## 2018-04-18 DIAGNOSIS — E06.3 HYPOTHYROIDISM DUE TO HASHIMOTO'S THYROIDITIS: Primary | ICD-10-CM

## 2018-04-18 DIAGNOSIS — E03.9 PRIMARY HYPOTHYROIDISM: ICD-10-CM

## 2018-04-18 DIAGNOSIS — E03.8 HYPOTHYROIDISM DUE TO HASHIMOTO'S THYROIDITIS: Primary | ICD-10-CM

## 2018-04-18 PROCEDURE — 99213 OFFICE O/P EST LOW 20 MIN: CPT | Performed by: NURSE PRACTITIONER

## 2018-04-18 RX ORDER — LEVOTHYROXINE SODIUM 137 MCG
137 TABLET ORAL DAILY
Qty: 30 TABLET | Refills: 4 | Status: SHIPPED | OUTPATIENT
Start: 2018-04-18 | End: 2018-11-07 | Stop reason: SDUPTHER

## 2018-04-18 RX ORDER — ERGOCALCIFEROL 1.25 MG/1
50000 CAPSULE ORAL WEEKLY
Qty: 24 CAPSULE | Refills: 4 | Status: SHIPPED | OUTPATIENT
Start: 2018-04-18 | End: 2018-12-05 | Stop reason: SDUPTHER

## 2018-04-18 RX ORDER — LIOTHYRONINE SODIUM 5 UG/1
5 TABLET ORAL DAILY
Qty: 60 TABLET | Refills: 5 | Status: SHIPPED | OUTPATIENT
Start: 2018-04-18 | End: 2018-04-27 | Stop reason: SDUPTHER

## 2018-04-18 NOTE — PROGRESS NOTES
Subjective   Vivian Brandt is a 33 y.o. female.     Hashimoto's Thyroiditis   This is a chronic problem. The current episode started more than 1 month ago. The problem occurs constantly. The problem has been rapidly improving. Pertinent negatives include no fatigue, headaches, myalgias or rash. Nothing aggravates the symptoms. Treatments tried: meds and labs.   Hypothyroidism   This is a chronic problem. The current episode started more than 1 year ago. The problem occurs constantly. The problem has been gradually worsening. Pertinent negatives include no fatigue, headaches, myalgias or rash. Nothing aggravates the symptoms. Treatments tried: labs and meds. The treatment provided significant relief.        The following portions of the patient's history were reviewed and updated as appropriate: current medications, past family history, past medical history, past social history, past surgical history and problem list.       Current Outpatient Prescriptions:   •  buPROPion XL (WELLBUTRIN XL) 300 MG 24 hr tablet, TAKE 1 TABLET BY MOUTH EVERY DAY, Disp: , Rfl: 2  •  liothyronine (CYTOMEL) 5 MCG tablet, Take 1 tablet by mouth Daily., Disp: 60 tablet, Rfl: 5  •  SYNTHROID 137 MCG tablet, Take 1 tablet by mouth Daily., Disp: 30 tablet, Rfl: 4  •  traZODone (DESYREL) 100 MG tablet, TAKE 1 1/2 tablet at night, Disp: , Rfl: 1  •  vitamin D (ERGOCALCIFEROL) 07793 units capsule capsule, Take 1 capsule by mouth 1 (One) Time Per Week. Take 1 capsule by mouth twice weekly, Disp: 24 capsule, Rfl: 4     Patient Active Problem List   Diagnosis   • Vitamin D deficiency   • Total body pain   • Hashimoto's thyroiditis   • Chronic fatigue   • Family history of diabetes mellitus (DM)   • Insomnia       Review of Systems   Constitutional: Negative for fatigue.   HENT: Negative for trouble swallowing.    Eyes: Negative for visual disturbance.   Respiratory: Negative for choking.    Cardiovascular: Negative for palpitations.    Gastrointestinal: Negative for constipation.   Endocrine: Negative for cold intolerance.   Genitourinary: Negative for menstrual problem.   Musculoskeletal: Negative for myalgias.   Skin: Negative for rash.   Allergic/Immunologic: Negative.    Neurological: Negative for headaches.   Hematological: Bruises/bleeds easily.   Psychiatric/Behavioral: Negative for sleep disturbance.     Results Encounter on 04/14/2018   Component Date Value Ref Range Status   • Glucose 04/12/2018 90  65 - 99 mg/dL Final   • BUN 04/12/2018 11  6 - 20 mg/dL Final   • Creatinine 04/12/2018 0.83  0.57 - 1.00 mg/dL Final   • eGFR Non  Am 04/12/2018 79  >60 mL/min/1.73 Final   • eGFR African Am 04/12/2018 96  >60 mL/min/1.73 Final   • BUN/Creatinine Ratio 04/12/2018 13.3  7.0 - 25.0 Final   • Sodium 04/12/2018 140  136 - 145 mmol/L Final   • Potassium 04/12/2018 4.5  3.5 - 5.2 mmol/L Final   • Chloride 04/12/2018 102  98 - 107 mmol/L Final   • Total CO2 04/12/2018 25.5  22.0 - 29.0 mmol/L Final   • Calcium 04/12/2018 9.2  8.6 - 10.5 mg/dL Final   • Total Protein 04/12/2018 6.6  6.0 - 8.5 g/dL Final   • Albumin 04/12/2018 4.10  3.50 - 5.20 g/dL Final   • Globulin 04/12/2018 2.5  gm/dL Final   • A/G Ratio 04/12/2018 1.6  g/dL Final   • Total Bilirubin 04/12/2018 0.2  0.1 - 1.2 mg/dL Final   • Alkaline Phosphatase 04/12/2018 71  39 - 117 U/L Final   • AST (SGOT) 04/12/2018 12  1 - 32 U/L Final   • ALT (SGPT) 04/12/2018 8  1 - 33 U/L Final   • Total Cholesterol 04/12/2018 143  0 - 200 mg/dL Final   • Triglycerides 04/12/2018 61  0 - 150 mg/dL Final   • HDL Cholesterol 04/12/2018 43  40 - 60 mg/dL Final   • VLDL Cholesterol 04/12/2018 12.2  5 - 40 mg/dL Final   • LDL Cholesterol  04/12/2018 88  0 - 100 mg/dL Final   • Uric Acid 04/12/2018 4.6  2.4 - 5.7 mg/dL Final   • 25 Hydroxy, Vitamin D 04/12/2018 50.3  30.0 - 100.0 ng/ml Final    Comment: Reference Range for Total Vitamin D 25(OH)  Deficiency    <20.0 ng/mL  Insufficiency 21-29  ng/mL  Sufficiency    ng/mL  Toxicity      >100 ng/ml        • TSH 04/12/2018 0.125* 0.270 - 4.200 mIU/mL Final   • Free T4 04/12/2018 1.59  0.93 - 1.70 ng/dL Final   • T3, Free 04/12/2018 3.8  2.0 - 4.4 pg/mL Final   • Interpretation 04/12/2018 Note   Final       Wt Readings from Last 3 Encounters:   04/18/18 93 kg (205 lb)   12/14/17 90.3 kg (199 lb)   08/30/17 87.5 kg (193 lb)     Temp Readings from Last 3 Encounters:   No data found for Temp     BP Readings from Last 3 Encounters:   04/18/18 112/76   12/14/17 122/80   08/30/17 112/72     Pulse Readings from Last 3 Encounters:   04/26/17 88     Objective   Physical Exam   Constitutional: She is oriented to person, place, and time. She appears well-developed and well-nourished.   HENT:   Head: Atraumatic.   Eyes: EOM are normal. Pupils are equal, round, and reactive to light.   Neck: Normal range of motion. Neck supple. No thyromegaly present.   Cardiovascular: Normal rate, regular rhythm, normal heart sounds and intact distal pulses.  Exam reveals no gallop.    No murmur heard.  Pulmonary/Chest: Effort normal and breath sounds normal.   Abdominal: Soft. Bowel sounds are normal.   Musculoskeletal: Normal range of motion.   Neurological: She is alert and oriented to person, place, and time.   Skin: Skin is dry. Capillary refill takes more than 3 seconds.   Psychiatric: She has a normal mood and affect. Her behavior is normal. Judgment and thought content normal.   Nursing note and vitals reviewed.        Assessment/Plan   Vivian was seen today for follow-up.    Diagnoses and all orders for this visit:    Hypothyroidism due to Hashimoto's thyroiditis  -     TSH; Future  -     Thyroid Antibodies; Future  -     T4, Free; Future  -     T4; Future  -     T3, Free; Future  -     T3; Future  -     Comprehensive Metabolic Panel; Future  -     C-Peptide; Future  -     Hemoglobin A1c; Future  -     Insulin, Total; Future  -     Lipid Panel; Future  -     Microalbumin  / Creatinine Urine Ratio - Urine, Clean Catch; Future  -     Uric Acid; Future  -     Vitamin D 25 Hydroxy; Future    Hashimoto's thyroiditis  -     Comprehensive Metabolic Panel; Future    Vitamin D deficiency  -     vitamin D (ERGOCALCIFEROL) 14893 units capsule capsule; Take 1 capsule by mouth 1 (One) Time Per Week. Take 1 capsule by mouth twice weekly  -     Comprehensive Metabolic Panel; Future  -     Vitamin D 25 Hydroxy; Future    Chronic fatigue  -     Comprehensive Metabolic Panel; Future    Primary hypothyroidism  -     SYNTHROID 137 MCG tablet; Take 1 tablet by mouth Daily.  -     liothyronine (CYTOMEL) 5 MCG tablet; Take 1 tablet by mouth Daily.  -     Comprehensive Metabolic Panel; Future          In summary: Met with the patient is metabolically stable and doing well.  Patient reports that she has no complaints at this time overall feels better, has been sleeping better.  Reviewed labs prior obtained to the visit and at this time there will be no medication changes.        Return if symptoms worsen or fail to improve, for Recheck. August appointment with Dr. Brown-2 weeks prior for labs

## 2018-04-27 DIAGNOSIS — E03.9 PRIMARY HYPOTHYROIDISM: ICD-10-CM

## 2018-04-27 RX ORDER — LIOTHYRONINE SODIUM 5 UG/1
TABLET ORAL
Qty: 60 TABLET | Refills: 4 | Status: SHIPPED | OUTPATIENT
Start: 2018-04-27 | End: 2018-10-06 | Stop reason: SDUPTHER

## 2018-06-29 DIAGNOSIS — E03.9 PRIMARY HYPOTHYROIDISM: ICD-10-CM

## 2018-06-29 RX ORDER — LEVOTHYROXINE SODIUM 137 MCG
TABLET ORAL
Qty: 30 TABLET | Refills: 3 | Status: SHIPPED | OUTPATIENT
Start: 2018-06-29 | End: 2018-11-07 | Stop reason: SDUPTHER

## 2018-07-27 DIAGNOSIS — E55.9 VITAMIN D DEFICIENCY: Primary | ICD-10-CM

## 2018-07-27 DIAGNOSIS — R53.82 CHRONIC FATIGUE: ICD-10-CM

## 2018-07-27 DIAGNOSIS — E06.3 HASHIMOTO'S THYROIDITIS: ICD-10-CM

## 2018-07-27 DIAGNOSIS — Z83.3 FAMILY HISTORY OF DIABETES MELLITUS (DM): ICD-10-CM

## 2018-08-10 ENCOUNTER — RESULTS ENCOUNTER (OUTPATIENT)
Dept: ENDOCRINOLOGY | Age: 33
End: 2018-08-10

## 2018-08-10 DIAGNOSIS — E03.9 PRIMARY HYPOTHYROIDISM: ICD-10-CM

## 2018-08-10 DIAGNOSIS — E03.8 HYPOTHYROIDISM DUE TO HASHIMOTO'S THYROIDITIS: ICD-10-CM

## 2018-08-10 DIAGNOSIS — E55.9 VITAMIN D DEFICIENCY: ICD-10-CM

## 2018-08-10 DIAGNOSIS — R53.82 CHRONIC FATIGUE: ICD-10-CM

## 2018-08-10 DIAGNOSIS — E06.3 HYPOTHYROIDISM DUE TO HASHIMOTO'S THYROIDITIS: ICD-10-CM

## 2018-08-10 DIAGNOSIS — E06.3 HASHIMOTO'S THYROIDITIS: ICD-10-CM

## 2018-08-27 ENCOUNTER — RESULTS ENCOUNTER (OUTPATIENT)
Dept: ENDOCRINOLOGY | Age: 33
End: 2018-08-27

## 2018-08-27 DIAGNOSIS — E06.3 HASHIMOTO'S THYROIDITIS: ICD-10-CM

## 2018-08-27 DIAGNOSIS — Z83.3 FAMILY HISTORY OF DIABETES MELLITUS (DM): ICD-10-CM

## 2018-08-27 DIAGNOSIS — R53.82 CHRONIC FATIGUE: ICD-10-CM

## 2018-08-27 DIAGNOSIS — E55.9 VITAMIN D DEFICIENCY: ICD-10-CM

## 2018-10-06 DIAGNOSIS — E03.9 PRIMARY HYPOTHYROIDISM: ICD-10-CM

## 2018-10-08 RX ORDER — LIOTHYRONINE SODIUM 5 UG/1
TABLET ORAL
Qty: 90 TABLET | Refills: 0 | Status: SHIPPED | OUTPATIENT
Start: 2018-10-08 | End: 2018-12-05 | Stop reason: SDUPTHER

## 2018-11-07 DIAGNOSIS — E03.9 PRIMARY HYPOTHYROIDISM: ICD-10-CM

## 2018-11-07 RX ORDER — LEVOTHYROXINE SODIUM 137 MCG
TABLET ORAL
Qty: 30 TABLET | Refills: 0 | Status: SHIPPED | OUTPATIENT
Start: 2018-11-07 | End: 2018-11-14 | Stop reason: SDUPTHER

## 2018-11-14 DIAGNOSIS — E03.9 PRIMARY HYPOTHYROIDISM: ICD-10-CM

## 2018-11-15 RX ORDER — LEVOTHYROXINE SODIUM 137 MCG
TABLET ORAL
Qty: 30 TABLET | Refills: 3 | Status: SHIPPED | OUTPATIENT
Start: 2018-11-15 | End: 2018-12-05 | Stop reason: SDUPTHER

## 2018-11-21 ENCOUNTER — LAB (OUTPATIENT)
Dept: ENDOCRINOLOGY | Age: 33
End: 2018-11-21

## 2018-11-21 DIAGNOSIS — E03.8 HYPOTHYROIDISM DUE TO HASHIMOTO'S THYROIDITIS: ICD-10-CM

## 2018-11-21 DIAGNOSIS — E06.3 HYPOTHYROIDISM DUE TO HASHIMOTO'S THYROIDITIS: ICD-10-CM

## 2018-11-27 LAB
25(OH)D3+25(OH)D2 SERPL-MCNC: 42.5 NG/ML (ref 30–100)
ALBUMIN SERPL-MCNC: 4.2 G/DL (ref 3.5–5.2)
ALBUMIN/CREAT UR: 32.1 MG/G CREAT (ref 0–30)
ALBUMIN/GLOB SERPL: 1.6 G/DL
ALP SERPL-CCNC: 84 U/L (ref 39–117)
ALT SERPL-CCNC: 7 U/L (ref 1–33)
AST SERPL-CCNC: 8 U/L (ref 1–32)
BILIRUB SERPL-MCNC: 0.2 MG/DL (ref 0.1–1.2)
BUN SERPL-MCNC: 12 MG/DL (ref 6–20)
BUN/CREAT SERPL: 13.5 (ref 7–25)
C PEPTIDE SERPL-MCNC: 3.6 NG/ML (ref 1.1–4.4)
CALCIUM SERPL-MCNC: 9.6 MG/DL (ref 8.6–10.5)
CHLORIDE SERPL-SCNC: 104 MMOL/L (ref 98–107)
CHOLEST SERPL-MCNC: 171 MG/DL (ref 0–200)
CO2 SERPL-SCNC: 26.7 MMOL/L (ref 22–29)
CREAT SERPL-MCNC: 0.89 MG/DL (ref 0.57–1)
CREAT UR-MCNC: 235.8 MG/DL
GLOBULIN SER CALC-MCNC: 2.7 GM/DL
GLUCOSE SERPL-MCNC: 93 MG/DL (ref 65–99)
HBA1C MFR BLD: 5.1 % (ref 4.8–5.6)
HDLC SERPL-MCNC: 54 MG/DL (ref 40–60)
INTERPRETATION: NORMAL
LDLC SERPL CALC-MCNC: 97 MG/DL (ref 0–100)
Lab: NORMAL
MICROALBUMIN UR-MCNC: 75.6 UG/ML
POTASSIUM SERPL-SCNC: 4.6 MMOL/L (ref 3.5–5.2)
PROT SERPL-MCNC: 6.9 G/DL (ref 6–8.5)
SODIUM SERPL-SCNC: 144 MMOL/L (ref 136–145)
T3FREE SERPL-MCNC: 2.9 PG/ML (ref 2–4.4)
T4 FREE SERPL-MCNC: 1.34 NG/DL (ref 0.93–1.7)
T4 SERPL-MCNC: 9.34 MCG/DL (ref 4.5–11.7)
THYROGLOB AB SERPL-ACNC: 3.1 IU/ML
THYROGLOB SERPL-MCNC: 3.8 NG/ML
THYROGLOB SERPL-MCNC: ABNORMAL NG/ML
TRIGL SERPL-MCNC: 101 MG/DL (ref 0–150)
TSH SERPL DL<=0.005 MIU/L-ACNC: 2.82 MIU/ML (ref 0.27–4.2)
URATE SERPL-MCNC: 5.6 MG/DL (ref 2.4–5.7)
VLDLC SERPL CALC-MCNC: 20.2 MG/DL (ref 5–40)

## 2018-12-05 ENCOUNTER — OFFICE VISIT (OUTPATIENT)
Dept: ENDOCRINOLOGY | Age: 33
End: 2018-12-05

## 2018-12-05 VITALS
BODY MASS INDEX: 34.83 KG/M2 | WEIGHT: 204 LBS | HEIGHT: 64 IN | SYSTOLIC BLOOD PRESSURE: 118 MMHG | DIASTOLIC BLOOD PRESSURE: 76 MMHG

## 2018-12-05 DIAGNOSIS — E03.9 PRIMARY HYPOTHYROIDISM: ICD-10-CM

## 2018-12-05 DIAGNOSIS — E55.9 VITAMIN D DEFICIENCY: ICD-10-CM

## 2018-12-05 DIAGNOSIS — E06.3 HYPOTHYROIDISM DUE TO HASHIMOTO'S THYROIDITIS: Primary | ICD-10-CM

## 2018-12-05 DIAGNOSIS — G47.00 INSOMNIA, UNSPECIFIED TYPE: ICD-10-CM

## 2018-12-05 DIAGNOSIS — R53.82 CHRONIC FATIGUE: ICD-10-CM

## 2018-12-05 DIAGNOSIS — E03.8 HYPOTHYROIDISM DUE TO HASHIMOTO'S THYROIDITIS: Primary | ICD-10-CM

## 2018-12-05 PROCEDURE — 99214 OFFICE O/P EST MOD 30 MIN: CPT | Performed by: NURSE PRACTITIONER

## 2018-12-05 RX ORDER — LEVOTHYROXINE SODIUM 137 MCG
137 TABLET ORAL DAILY
Qty: 30 TABLET | Refills: 3 | Status: SHIPPED | OUTPATIENT
Start: 2018-12-05 | End: 2019-07-09 | Stop reason: SDUPTHER

## 2018-12-05 RX ORDER — ERGOCALCIFEROL 1.25 MG/1
50000 CAPSULE ORAL WEEKLY
Qty: 24 CAPSULE | Refills: 4 | Status: SHIPPED | OUTPATIENT
Start: 2018-12-05 | End: 2019-07-09 | Stop reason: SDUPTHER

## 2018-12-05 RX ORDER — LIOTHYRONINE SODIUM 5 UG/1
5 TABLET ORAL DAILY
Qty: 90 TABLET | Refills: 1 | Status: SHIPPED | OUTPATIENT
Start: 2018-12-05 | End: 2019-07-09 | Stop reason: SDUPTHER

## 2018-12-05 NOTE — PROGRESS NOTES
Subjective    Vivian Brandt is a 33 y.o. female. she is here to be seen for hypothyroidism.     Hypothyroid, insomnia      Hypothyroidism   This is a chronic problem. The current episode started more than 1 year ago. The problem occurs constantly. The problem has been waxing and waning. Associated symptoms include fatigue. Pertinent negatives include no myalgias or rash. Nothing aggravates the symptoms. Treatments tried: meds and labs. The treatment provided moderate relief.   Insomnia   This is a chronic problem. The current episode started more than 1 year ago. The problem occurs constantly. The problem has been gradually improving. Associated symptoms include fatigue. Pertinent negatives include no myalgias or rash. Nothing aggravates the symptoms. She has tried eating, sleep and rest for the symptoms. The treatment provided significant relief.        Evaluation history:  TSH   Date Value Ref Range Status   11/21/2018 2.820 0.270 - 4.200 mIU/mL Final     Free T4   Date Value Ref Range Status   11/21/2018 1.34 0.93 - 1.70 ng/dL Final     T3, Free   Date Value Ref Range Status   11/21/2018 2.9 2.0 - 4.4 pg/mL Final       Current medications:  Current Outpatient Medications   Medication Sig Dispense Refill   • buPROPion XL (WELLBUTRIN XL) 300 MG 24 hr tablet TAKE 1 TABLET BY MOUTH EVERY DAY  2   • liothyronine (CYTOMEL) 5 MCG tablet Take 1 tablet by mouth Daily. 90 tablet 1   • SYNTHROID 137 MCG tablet Take 1 tablet by mouth Daily. 30 tablet 3   • traZODone (DESYREL) 100 MG tablet TAKE 1 1/2 tablet at night  1   • vitamin D (ERGOCALCIFEROL) 65074 units capsule capsule Take 1 capsule by mouth 1 (One) Time Per Week. Take 1 capsule by mouth twice weekly 24 capsule 4     No current facility-administered medications for this visit.        The following portions of the patient's history were reviewed and updated as appropriate:   Past Medical History:   Diagnosis Date   • Hashimoto's thyroiditis      History reviewed.  "No pertinent surgical history.  Family History   Problem Relation Age of Onset   • Hypertension Mother    • Hyperlipidemia Mother    • Thyroid disease Mother    • Cancer Father    • Diabetes Father      OB History     No data available        No Known Allergies  Social History     Socioeconomic History   • Marital status:      Spouse name: Not on file   • Number of children: Not on file   • Years of education: Not on file   • Highest education level: Not on file   Tobacco Use   • Smoking status: Never Smoker   • Smokeless tobacco: Never Used   Substance and Sexual Activity   • Alcohol use: Yes     Comment: 1 drink every couple of months       Review of Systems  Review of Systems   Constitutional: Positive for fatigue.   HENT: Negative for trouble swallowing.    Eyes: Negative for visual disturbance.   Respiratory: Negative for choking.    Cardiovascular: Negative for palpitations.   Gastrointestinal: Negative for constipation.   Endocrine: Negative for cold intolerance.   Genitourinary: Negative for menstrual problem.   Musculoskeletal: Negative for myalgias.   Skin: Negative for rash.   Allergic/Immunologic: Negative.    Neurological: Positive for dizziness.   Hematological: Does not bruise/bleed easily.   Psychiatric/Behavioral: Negative for sleep disturbance. The patient has insomnia.         Objective    /76   Ht 162.9 cm (64.13\")   Wt 92.5 kg (204 lb)   BMI 34.87 kg/m²   Physical Exam   Constitutional: She is oriented to person, place, and time. She appears well-developed and well-nourished. No distress.   HENT:   Head: Normocephalic and atraumatic.   Eyes: EOM are normal. Pupils are equal, round, and reactive to light.   Neck: Normal range of motion. Neck supple.   Cardiovascular: Normal rate, regular rhythm, normal heart sounds and intact distal pulses.   No murmur heard.  Pulmonary/Chest: Effort normal and breath sounds normal.   Abdominal: Soft. Bowel sounds are normal.   Musculoskeletal: " Normal range of motion.   Neurological: She is alert and oriented to person, place, and time.   Skin: Skin is warm and dry. Capillary refill takes 2 to 3 seconds. She is not diaphoretic. No pallor.   Psychiatric: She has a normal mood and affect. Her behavior is normal. Judgment and thought content normal.   Nursing note and vitals reviewed.      Lab Review  Lab Results   Component Value Date    TSH 2.820 11/21/2018     Lab Results   Component Value Date    FREET4 1.34 11/21/2018        Assessment/Plan      1. Hypothyroidism due to Hashimoto's thyroiditis    2. Vitamin D deficiency    3. Chronic fatigue    4. Insomnia, unspecified type    5. Primary hypothyroidism    . This diagnosis was discussed and reviewed with the patient including the advantages of drug therapy.     1. Orders placed during this encounter include:  Orders Placed This Encounter   Procedures   • Comprehensive Metabolic Panel     Standing Status:   Future   • Lipid Panel     Standing Status:   Future     Standing Expiration Date:   12/5/2019     Order Specific Question:   [facility] Has the patient fasted?     Answer:   No   • Uric Acid     Standing Status:   Future   • Vitamin D 25 Hydroxy     Standing Status:   Future   • TSH     Standing Status:   Future   • T4     Standing Status:   Future   • T3     Standing Status:   Future       Medications prescribed:  Outpatient Encounter Medications as of 12/5/2018   Medication Sig Dispense Refill   • buPROPion XL (WELLBUTRIN XL) 300 MG 24 hr tablet TAKE 1 TABLET BY MOUTH EVERY DAY  2   • liothyronine (CYTOMEL) 5 MCG tablet Take 1 tablet by mouth Daily. 90 tablet 1   • SYNTHROID 137 MCG tablet Take 1 tablet by mouth Daily. 30 tablet 3   • traZODone (DESYREL) 100 MG tablet TAKE 1 1/2 tablet at night  1   • vitamin D (ERGOCALCIFEROL) 97692 units capsule capsule Take 1 capsule by mouth 1 (One) Time Per Week. Take 1 capsule by mouth twice weekly 24 capsule 4   • [DISCONTINUED] liothyronine (CYTOMEL) 5 MCG  tablet TAKE 1 TABLET BY MOUTH EVERY DAY 90 tablet 0   • [DISCONTINUED] SYNTHROID 137 MCG tablet TAKE 1 TABLET BY MOUTH DAILY ON EMPTY STOMACH 30 tablet 3   • [DISCONTINUED] vitamin D (ERGOCALCIFEROL) 44467 units capsule capsule Take 1 capsule by mouth 1 (One) Time Per Week. Take 1 capsule by mouth twice weekly 24 capsule 4     No facility-administered encounter medications on file as of 12/5/2018.        2. Repeat s-TSH in before patient's next visit.    3. The risks and benefits of my recommendations, as well as other treatment options were discussed with the patient today. Questions were answered.      4. Return in about 4 months (around 4/5/2019), or if symptoms worsen or fail to improve, for Recheck. 3 months with Lydia-2 weeks prior for labs 6 months with Dr. Brown-2 weeks prior for labs

## 2019-03-05 ENCOUNTER — RESULTS ENCOUNTER (OUTPATIENT)
Dept: ENDOCRINOLOGY | Age: 34
End: 2019-03-05

## 2019-03-05 DIAGNOSIS — E03.8 HYPOTHYROIDISM DUE TO HASHIMOTO'S THYROIDITIS: ICD-10-CM

## 2019-03-05 DIAGNOSIS — R53.82 CHRONIC FATIGUE: ICD-10-CM

## 2019-03-05 DIAGNOSIS — E03.9 PRIMARY HYPOTHYROIDISM: ICD-10-CM

## 2019-03-05 DIAGNOSIS — E06.3 HYPOTHYROIDISM DUE TO HASHIMOTO'S THYROIDITIS: ICD-10-CM

## 2019-03-05 DIAGNOSIS — G47.00 INSOMNIA, UNSPECIFIED TYPE: ICD-10-CM

## 2019-03-05 DIAGNOSIS — E55.9 VITAMIN D DEFICIENCY: ICD-10-CM

## 2019-05-15 ENCOUNTER — TELEPHONE (OUTPATIENT)
Dept: ENDOCRINOLOGY | Age: 34
End: 2019-05-15

## 2019-05-15 NOTE — TELEPHONE ENCOUNTER
----- Message from Cricket Brown MD sent at 5/13/2019  8:46 AM EDT -----  Contact: patient  When she is done seeing the gynecologist if they do any blood tests have them fax it to us otherwise let us know again so we may need to order some labs even if she cannot be seen anytime soon.  ----- Message -----  From: Sujatha Yo MA  Sent: 5/11/2019  10:26 AM  To: Cricket Brown MD        ----- Message -----  From: Kaylynn Artis  Sent: 5/9/2019   4:36 PM  To: Sujatha Yo MA, Amparo Bejarano MA    Patient returned my call and said she will see either Lydia or Dr. Brown. She said she has not seen her OBGYN Dr. Naveed Rivera yet but has an appointment scheduled on 5-23-19. She said she will be due approx. 1st week of January 2020.     I advised her that I will check  and get back with her regards to an appointment. I was unable to find a slot on Lydia or Dr. Brown for upcoming weeks. Pt. [phone]  ----- Message -----  From: Amparo Bejarano MA  Sent: 5/8/2019   9:22 AM  To: Kaylynn Artis    Was the patient requesting the appointment be with Dr. Brown or Lydia?  ----- Message -----  From: Lydia Haddad APRN  Sent: 5/7/2019   4:56 PM  To: Amparo Bejarano MA    Why is this coming to me.. She has requested Dr. Brown. Then okay. Dr Brown so he can make sure she is taking the correct medication.     ----- Message -----  From: Amparo Bejarano MA  Sent: 5/7/2019   4:12 PM  To: JOSESITO Deleon        ----- Message -----  From: Kaylynn Artis  Sent: 5/7/2019   4:02 PM  To: Amparo Bejarano MA    Patient said she is about 5 weeks pregnant and asking for sooner appointment than 7-9-19 with Dr. Brown so she can make sure she is taking the correct medication.     Pt [phone]        Left patient a detailed voicemail.

## 2019-05-23 LAB
EXTERNAL ABO GROUPING: NORMAL
EXTERNAL GROUP B STREP ANTIGEN: NORMAL
EXTERNAL HEPATITIS B SURFACE ANTIGEN: NEGATIVE
EXTERNAL HEPATITIS C AB: NORMAL
EXTERNAL RH FACTOR: POSITIVE
EXTERNAL RUBELLA QUALITATIVE: NORMAL
EXTERNAL SYPHILIS RPR SCREEN: NORMAL
HIV1 P24 AG SERPL QL IA: NEGATIVE

## 2019-07-09 ENCOUNTER — OFFICE VISIT (OUTPATIENT)
Dept: ENDOCRINOLOGY | Age: 34
End: 2019-07-09

## 2019-07-09 VITALS
HEIGHT: 64 IN | DIASTOLIC BLOOD PRESSURE: 72 MMHG | SYSTOLIC BLOOD PRESSURE: 118 MMHG | WEIGHT: 222.4 LBS | BODY MASS INDEX: 37.97 KG/M2 | RESPIRATION RATE: 16 BRPM

## 2019-07-09 DIAGNOSIS — E06.3 HYPOTHYROIDISM DUE TO HASHIMOTO'S THYROIDITIS: Primary | ICD-10-CM

## 2019-07-09 DIAGNOSIS — Z83.3 FAMILY HISTORY OF DIABETES MELLITUS (DM): ICD-10-CM

## 2019-07-09 DIAGNOSIS — E03.9 PRIMARY HYPOTHYROIDISM: ICD-10-CM

## 2019-07-09 DIAGNOSIS — E03.8 HYPOTHYROIDISM DUE TO HASHIMOTO'S THYROIDITIS: Primary | ICD-10-CM

## 2019-07-09 DIAGNOSIS — E55.9 VITAMIN D DEFICIENCY: ICD-10-CM

## 2019-07-09 DIAGNOSIS — R53.82 CHRONIC FATIGUE: ICD-10-CM

## 2019-07-09 DIAGNOSIS — E06.3 HASHIMOTO'S THYROIDITIS: ICD-10-CM

## 2019-07-09 PROCEDURE — 99214 OFFICE O/P EST MOD 30 MIN: CPT | Performed by: INTERNAL MEDICINE

## 2019-07-09 RX ORDER — ERGOCALCIFEROL 1.25 MG/1
50000 CAPSULE ORAL 2 TIMES WEEKLY
Qty: 26 CAPSULE | Refills: 3 | Status: SHIPPED | OUTPATIENT
Start: 2019-07-11 | End: 2020-07-06

## 2019-07-09 RX ORDER — LEVOTHYROXINE SODIUM 137 MCG
137 TABLET ORAL DAILY
Qty: 30 TABLET | Refills: 5 | Status: SHIPPED | OUTPATIENT
Start: 2019-07-09 | End: 2019-12-06

## 2019-07-09 RX ORDER — LIOTHYRONINE SODIUM 5 UG/1
5 TABLET ORAL DAILY
Qty: 90 TABLET | Refills: 1 | Status: SHIPPED | OUTPATIENT
Start: 2019-07-09 | End: 2019-11-20 | Stop reason: SDUPTHER

## 2019-07-09 NOTE — PROGRESS NOTES
"Subjective   Vivian Martínez is a 34 y.o. female seen for follow up for hashimoto's. No lab review. Patient is currently 15 weeks pregnant. She states that she has not taken her vitamin d x 2 months but has been taking prenatal vitamins which have vitamin d in them. She denies any problems or concerns.     History of Present Illness this is a 34-year-old female known patient with Hashimoto thyroiditis and chronic fatigue with dyslipidemia and obesity and family history of type 2 diabetes.  She is 15 weeks pregnant and over the course of last 6 months she has had no significant health problems for which to go to the ER or hospital.  This is her first pregnancy.    /72   Resp 16   Ht 162.6 cm (64\")   Wt 101 kg (222 lb 6.4 oz)   BMI 38.17 kg/m²      No Known Allergies    Current Outpatient Medications:   •  buPROPion XL (WELLBUTRIN XL) 300 MG 24 hr tablet, TAKE 1 TABLET BY MOUTH EVERY DAY, Disp: , Rfl: 2  •  liothyronine (CYTOMEL) 5 MCG tablet, Take 1 tablet by mouth Daily., Disp: 90 tablet, Rfl: 1  •  SYNTHROID 137 MCG tablet, Take 1 tablet by mouth Daily., Disp: 30 tablet, Rfl: 3  •  traZODone (DESYREL) 100 MG tablet, TAKE 1 1/2 tablet at night, Disp: , Rfl: 1  •  vitamin D (ERGOCALCIFEROL) 94023 units capsule capsule, Take 1 capsule by mouth 1 (One) Time Per Week. Take 1 capsule by mouth twice weekly, Disp: 24 capsule, Rfl: 4      The following portions of the patient's history were reviewed and updated as appropriate: allergies, current medications, past family history, past medical history, past social history, past surgical history and problem list.    Review of Systems   Constitutional: Negative.    HENT: Negative.    Eyes: Negative.    Respiratory: Negative.    Cardiovascular: Negative.    Gastrointestinal: Negative.    Endocrine: Negative.    Genitourinary: Negative.    Musculoskeletal: Negative.    Skin: Negative.    Allergic/Immunologic: Negative.    Neurological: Negative.    Hematological: " Negative.    Psychiatric/Behavioral: Negative.        Objective   Physical Exam   Constitutional: She is oriented to person, place, and time. She appears well-developed and well-nourished. No distress.   HENT:   Head: Normocephalic and atraumatic.   Right Ear: External ear normal.   Left Ear: External ear normal.   Nose: Nose normal.   Mouth/Throat: Oropharynx is clear and moist. No oropharyngeal exudate.   Eyes: Conjunctivae and EOM are normal. Pupils are equal, round, and reactive to light. Right eye exhibits no discharge. Left eye exhibits no discharge. No scleral icterus.   Neck: Normal range of motion. Neck supple. No JVD present. No tracheal deviation present. No thyromegaly present.   Cardiovascular: Normal rate, regular rhythm, normal heart sounds and intact distal pulses. Exam reveals no gallop and no friction rub.   No murmur heard.  Pulmonary/Chest: Effort normal and breath sounds normal. No stridor. No respiratory distress. She has no wheezes. She has no rales. She exhibits no tenderness.   Abdominal: Soft. Bowel sounds are normal. She exhibits no distension and no mass. There is no tenderness. There is no rebound and no guarding. No hernia.   Musculoskeletal: Normal range of motion. She exhibits no edema, tenderness or deformity.   Lymphadenopathy:     She has no cervical adenopathy.   Neurological: She is alert and oriented to person, place, and time. She has normal reflexes. She displays normal reflexes. No cranial nerve deficit or sensory deficit. She exhibits normal muscle tone. Coordination normal.   Skin: Skin is warm and dry. No rash noted. She is not diaphoretic. No erythema. No pallor.   Psychiatric: She has a normal mood and affect. Her behavior is normal. Judgment and thought content normal.   Nursing note and vitals reviewed.        Assessment/Plan   Diagnoses and all orders for this visit:    Hypothyroidism due to Hashimoto's thyroiditis  -     T3, Free  -     T4 & TSH ([facility])  -     T4,  Free  -     Thyroglobulin With Anti-TG  -     Uric Acid  -     Vitamin D 25 Hydroxy  -     Comprehensive Metabolic Panel  -     C-Peptide  -     Hemoglobin A1c  -     Lipid Panel  -     T4 & TSH ([facility]); Future  -     T3, Free; Future  -     T4, Free; Future  -     Thyroglobulin With Anti-TG; Future  -     Uric Acid; Future  -     Vitamin D 25 Hydroxy; Future  -     Comprehensive Metabolic Panel; Future  -     C-Peptide; Future  -     Hemoglobin A1c; Future  -     Lipid Panel; Future    Hashimoto's thyroiditis  -     T3, Free  -     T4 & TSH ([facility])  -     T4, Free  -     Thyroglobulin With Anti-TG  -     Uric Acid  -     Vitamin D 25 Hydroxy  -     Comprehensive Metabolic Panel  -     C-Peptide  -     Hemoglobin A1c  -     Lipid Panel  -     T4 & TSH ([facility]); Future  -     T3, Free; Future  -     T4, Free; Future  -     Thyroglobulin With Anti-TG; Future  -     Uric Acid; Future  -     Vitamin D 25 Hydroxy; Future  -     Comprehensive Metabolic Panel; Future  -     C-Peptide; Future  -     Hemoglobin A1c; Future  -     Lipid Panel; Future    Vitamin D deficiency  -     T3, Free  -     T4 & TSH ([facility])  -     T4, Free  -     Thyroglobulin With Anti-TG  -     Uric Acid  -     Vitamin D 25 Hydroxy  -     Comprehensive Metabolic Panel  -     C-Peptide  -     Hemoglobin A1c  -     Lipid Panel  -     vitamin D (ERGOCALCIFEROL) 82205 units capsule capsule; Take 1 capsule by mouth 2 (Two) Times a Week. Take 1 capsule by mouth twice weekly  -     T4 & TSH ([facility]); Future  -     T3, Free; Future  -     T4, Free; Future  -     Thyroglobulin With Anti-TG; Future  -     Uric Acid; Future  -     Vitamin D 25 Hydroxy; Future  -     Comprehensive Metabolic Panel; Future  -     C-Peptide; Future  -     Hemoglobin A1c; Future  -     Lipid Panel; Future    Chronic fatigue  -     T3, Free  -     T4 & TSH ([facility])  -     T4, Free  -     Thyroglobulin With Anti-TG  -     Uric Acid  -     Vitamin D 25 Hydroxy  -      Comprehensive Metabolic Panel  -     C-Peptide  -     Hemoglobin A1c  -     Lipid Panel  -     T4 & TSH ([facility]); Future  -     T3, Free; Future  -     T4, Free; Future  -     Thyroglobulin With Anti-TG; Future  -     Uric Acid; Future  -     Vitamin D 25 Hydroxy; Future  -     Comprehensive Metabolic Panel; Future  -     C-Peptide; Future  -     Hemoglobin A1c; Future  -     Lipid Panel; Future    Family history of diabetes mellitus (DM)  -     T3, Free  -     T4 & TSH ([facility])  -     T4, Free  -     Thyroglobulin With Anti-TG  -     Uric Acid  -     Vitamin D 25 Hydroxy  -     Comprehensive Metabolic Panel  -     C-Peptide  -     Hemoglobin A1c  -     Lipid Panel  -     T4 & TSH ([facility]); Future  -     T3, Free; Future  -     T4, Free; Future  -     Thyroglobulin With Anti-TG; Future  -     Uric Acid; Future  -     Vitamin D 25 Hydroxy; Future  -     Comprehensive Metabolic Panel; Future  -     C-Peptide; Future  -     Hemoglobin A1c; Future  -     Lipid Panel; Future    Primary hypothyroidism  -     SYNTHROID 137 MCG tablet; Take 1 tablet by mouth Daily.  -     liothyronine (CYTOMEL) 5 MCG tablet; Take 1 tablet by mouth Daily.  -     T4 & TSH ([facility]); Future  -     T3, Free; Future  -     T4, Free; Future  -     Thyroglobulin With Anti-TG; Future  -     Uric Acid; Future  -     Vitamin D 25 Hydroxy; Future  -     Comprehensive Metabolic Panel; Future  -     C-Peptide; Future  -     Hemoglobin A1c; Future  -     Lipid Panel; Future      In summary I saw and examined this 34-year-old female for above-mentioned problems.  I reviewed her laboratory evaluation of 11/21/2018 and at this time we will go ahead and order additional laboratory evaluation and once the results come back we will go ahead and call for any possible modification or new medications.  She will see Ms. Lydia Haddad in 4 months or sooner if needed with laboratory evaluation prior to each office visit.

## 2019-07-15 LAB
25(OH)D3+25(OH)D2 SERPL-MCNC: 31.8 NG/ML (ref 30–100)
ALBUMIN SERPL-MCNC: 3.9 G/DL (ref 3.5–5.2)
ALBUMIN/GLOB SERPL: 1.3 G/DL
ALP SERPL-CCNC: 81 U/L (ref 39–117)
ALT SERPL-CCNC: 12 U/L (ref 1–33)
AST SERPL-CCNC: 13 U/L (ref 1–32)
BILIRUB SERPL-MCNC: 0.2 MG/DL (ref 0.2–1.2)
BUN SERPL-MCNC: 10 MG/DL (ref 6–20)
BUN/CREAT SERPL: 14.5 (ref 7–25)
C PEPTIDE SERPL-MCNC: 8.4 NG/ML (ref 1.1–4.4)
CALCIUM SERPL-MCNC: 9.4 MG/DL (ref 8.6–10.5)
CHLORIDE SERPL-SCNC: 102 MMOL/L (ref 98–107)
CHOLEST SERPL-MCNC: 201 MG/DL (ref 0–200)
CO2 SERPL-SCNC: 24.7 MMOL/L (ref 22–29)
CREAT SERPL-MCNC: 0.69 MG/DL (ref 0.57–1)
GLOBULIN SER CALC-MCNC: 2.9 GM/DL
GLUCOSE SERPL-MCNC: 96 MG/DL (ref 65–99)
HBA1C MFR BLD: 5.1 % (ref 4.8–5.6)
HDLC SERPL-MCNC: 74 MG/DL (ref 40–60)
INTERPRETATION: NORMAL
LDLC SERPL CALC-MCNC: 75 MG/DL (ref 0–100)
Lab: NORMAL
POTASSIUM SERPL-SCNC: 4.6 MMOL/L (ref 3.5–5.2)
PROT SERPL-MCNC: 6.8 G/DL (ref 6–8.5)
SODIUM SERPL-SCNC: 139 MMOL/L (ref 136–145)
T3FREE SERPL-MCNC: 2.9 PG/ML (ref 2–4.4)
T4 FREE SERPL-MCNC: 1.22 NG/DL (ref 0.93–1.7)
T4 SERPL-MCNC: 14.4 MCG/DL (ref 4.5–11.7)
THYROGLOB AB SERPL-ACNC: 1 IU/ML (ref 0–0.9)
THYROGLOB SERPL-MCNC: <2 NG/ML
TRIGL SERPL-MCNC: 258 MG/DL (ref 0–150)
TSH SERPL DL<=0.005 MIU/L-ACNC: 1.66 MIU/ML (ref 0.27–4.2)
URATE SERPL-MCNC: 4.5 MG/DL (ref 2.4–5.7)
VLDLC SERPL CALC-MCNC: 51.6 MG/DL

## 2019-10-14 ENCOUNTER — TELEPHONE (OUTPATIENT)
Dept: ENDOCRINOLOGY | Age: 34
End: 2019-10-14

## 2019-10-14 NOTE — TELEPHONE ENCOUNTER
NEED LAB ORDERS BY 22 ND OF OCT SENT TO [facility] IN [facility]  PATIENT DID NOT HAVE THE FAX NUMBER

## 2019-10-15 ENCOUNTER — TELEPHONE (OUTPATIENT)
Dept: ENDOCRINOLOGY | Age: 34
End: 2019-10-15

## 2019-10-28 NOTE — PROGRESS NOTES
Subjective    Vivian Martínez is a 34 y.o. female. she is here to be seen for hypothyroidism.     Hypothyroid, pregnancy  Abnormal BG testing with pregnancy.       Hypothyroidism   This is a chronic problem. The current episode started more than 1 month ago. The problem occurs constantly. The problem has been waxing and waning. Nothing aggravates the symptoms. Treatments tried: meds and labs. The treatment provided mild relief.        Evaluation history:  TSH   Date Value Ref Range Status   07/09/2019 1.660 0.270 - 4.200 mIU/mL Final     Free T4   Date Value Ref Range Status   07/09/2019 1.22 0.93 - 1.70 ng/dL Final     T3, Free   Date Value Ref Range Status   07/09/2019 2.9 2.0 - 4.4 pg/mL Final       Current medications:  Current Outpatient Medications   Medication Sig Dispense Refill   • buPROPion XL (WELLBUTRIN XL) 300 MG 24 hr tablet TAKE 1 TABLET BY MOUTH EVERY DAY  2   • ferrous sulfate (IRON SUPPLEMENT) 325 (65 FE) MG tablet Take 325 mg by mouth Daily With Breakfast.     • liothyronine (CYTOMEL) 5 MCG tablet Take 1 tablet by mouth Daily. 90 tablet 1   • Prenatal Vit-Fe Fumarate-FA (PNV PRENATAL PLUS MULTIVITAMIN) 27-1 MG tablet Take 1 tablet by mouth Daily. 200001 12   • SYNTHROID 137 MCG tablet Take 1 tablet by mouth Daily. 30 tablet 5   • vitamin D (ERGOCALCIFEROL) 25791 units capsule capsule Take 1 capsule by mouth 2 (Two) Times a Week. Take 1 capsule by mouth twice weekly 26 capsule 3     No current facility-administered medications for this visit.        The following portions of the patient's history were reviewed and updated as appropriate:   Past Medical History:   Diagnosis Date   • Hashimoto's thyroiditis      History reviewed. No pertinent surgical history.  Family History   Problem Relation Age of Onset   • Hypertension Mother    • Hyperlipidemia Mother    • Thyroid disease Mother    • Cancer Father    • Diabetes Father      OB History     No data available        No Known Allergies  Social  History     Socioeconomic History   • Marital status:      Spouse name: Not on file   • Number of children: Not on file   • Years of education: Not on file   • Highest education level: Not on file   Tobacco Use   • Smoking status: Never Smoker   • Smokeless tobacco: Never Used   Substance and Sexual Activity   • Alcohol use: Yes     Comment: 1 drink every couple of months       Review of Systems  Review of Systems   All other systems reviewed and are negative.       Objective    /90 (BP Location: Left arm, Patient Position: Sitting, Cuff Size: Large Adult)   Pulse 95   Wt 113 kg (249 lb 3.2 oz)   SpO2 99%   BMI 42.78 kg/m²   Physical Exam   Constitutional: She is oriented to person, place, and time. She appears well-developed and well-nourished. No distress.   HENT:   Head: Normocephalic and atraumatic.   Eyes: EOM are normal. Pupils are equal, round, and reactive to light.   Neck: Normal range of motion. Neck supple.   Cardiovascular: Normal rate, regular rhythm, normal heart sounds and intact distal pulses.   No murmur heard.  Pulmonary/Chest: Effort normal and breath sounds normal.   Abdominal: Soft. Bowel sounds are normal.   Musculoskeletal: Normal range of motion.   Neurological: She is alert and oriented to person, place, and time.   Skin: Skin is warm and dry. Capillary refill takes 2 to 3 seconds. She is not diaphoretic. No pallor.   Psychiatric: She has a normal mood and affect. Her behavior is normal. Judgment and thought content normal.   Nursing note and vitals reviewed.      Lab Review  Lab Results   Component Value Date    TSH 1.660 07/09/2019     Lab Results   Component Value Date    FREET4 1.22 07/09/2019        Assessment/Plan      1. Hypothyroidism due to Hashimoto's thyroiditis    2. Vitamin D deficiency    3. 31 weeks gestation of pregnancy    4. Primary hypothyroidism    5. Hashimoto's thyroiditis    6. Family history of diabetes mellitus    . This diagnosis was discussed and  reviewed with the patient including the advantages of drug therapy.     1. Orders placed during this encounter include:  Orders Placed This Encounter   Procedures   • Fructosamine   • Comprehensive Metabolic Panel   • C-Peptide   • Hemoglobin A1c   • Insulin, Total   • Uric Acid   • Vitamin D 25 Hydroxy   • TSH   • Thyroid Antibodies   • T4, Free   • T4   • T3, Free   • T3       Medications prescribed:  Outpatient Encounter Medications as of 10/29/2019   Medication Sig Dispense Refill   • buPROPion XL (WELLBUTRIN XL) 300 MG 24 hr tablet TAKE 1 TABLET BY MOUTH EVERY DAY  2   • ferrous sulfate (IRON SUPPLEMENT) 325 (65 FE) MG tablet Take 325 mg by mouth Daily With Breakfast.     • liothyronine (CYTOMEL) 5 MCG tablet Take 1 tablet by mouth Daily. 90 tablet 1   • Prenatal Vit-Fe Fumarate-FA (PNV PRENATAL PLUS MULTIVITAMIN) 27-1 MG tablet Take 1 tablet by mouth Daily. 200001 12   • SYNTHROID 137 MCG tablet Take 1 tablet by mouth Daily. 30 tablet 5   • vitamin D (ERGOCALCIFEROL) 28418 units capsule capsule Take 1 capsule by mouth 2 (Two) Times a Week. Take 1 capsule by mouth twice weekly 26 capsule 3   • [DISCONTINUED] buPROPion (WELLBUTRIN) 75 MG tablet Take 75 mg by mouth Daily.     • [DISCONTINUED] traZODone (DESYREL) 100 MG tablet TAKE 1 1/2 tablet at night  1     No facility-administered encounter medications on file as of 10/29/2019.        2. Repeat s-TSH in 6-8 weeks and before patient's next visit.    3. The risks and benefits of my recommendations, as well as other treatment options were discussed with the patient today. Questions were answered.    Diagnoses and all orders for this visit:    1. Hypothyroidism due to Hashimoto's thyroiditis (Primary)- chronic, stable no medication changes at this time. Refills prescribed. Future labs ordered for upcoming appointment and assessment.        2. Vitamin D deficiency- chronic, stable no medication changes at this time. Refills prescribed. Future labs ordered for  upcoming appointment and assessment.        3. 31 weeks gestation of pregnancy- chronic, stable no medication changes at this time. Refills prescribed. Future labs ordered for upcoming appointment and assessment.   Abnormal glucose testing - has done the 3hr gtt. Will obtain additional lab work today.      Due January 5th.2020     4. Return in about 4 weeks (around 11/26/2019), or if symptoms worsen or fail to improve, for Recheck.

## 2019-10-29 ENCOUNTER — OFFICE VISIT (OUTPATIENT)
Dept: ENDOCRINOLOGY | Age: 34
End: 2019-10-29

## 2019-10-29 VITALS
WEIGHT: 249.2 LBS | OXYGEN SATURATION: 99 % | HEART RATE: 95 BPM | SYSTOLIC BLOOD PRESSURE: 132 MMHG | BODY MASS INDEX: 42.78 KG/M2 | DIASTOLIC BLOOD PRESSURE: 90 MMHG

## 2019-10-29 DIAGNOSIS — E03.8 HYPOTHYROIDISM DUE TO HASHIMOTO'S THYROIDITIS: Primary | ICD-10-CM

## 2019-10-29 DIAGNOSIS — Z3A.31 31 WEEKS GESTATION OF PREGNANCY: ICD-10-CM

## 2019-10-29 DIAGNOSIS — E06.3 HASHIMOTO'S THYROIDITIS: ICD-10-CM

## 2019-10-29 DIAGNOSIS — Z83.3 FAMILY HISTORY OF DIABETES MELLITUS: ICD-10-CM

## 2019-10-29 DIAGNOSIS — E03.9 PRIMARY HYPOTHYROIDISM: ICD-10-CM

## 2019-10-29 DIAGNOSIS — E55.9 VITAMIN D DEFICIENCY: ICD-10-CM

## 2019-10-29 DIAGNOSIS — E06.3 HYPOTHYROIDISM DUE TO HASHIMOTO'S THYROIDITIS: Primary | ICD-10-CM

## 2019-10-29 PROCEDURE — 99214 OFFICE O/P EST MOD 30 MIN: CPT | Performed by: NURSE PRACTITIONER

## 2019-10-29 RX ORDER — FERROUS SULFATE 325(65) MG
325 TABLET ORAL 2 TIMES DAILY
COMMUNITY
End: 2020-08-13 | Stop reason: ALTCHOICE

## 2019-10-29 RX ORDER — VITAMIN A ACETATE, .BETA.-CAROTENE, ASCORBIC ACID, CHOLECALCIFEROL, .ALPHA.-TOCOPHEROL ACETATE, DL-, THIAMINE MONONITRATE, RIBOFLAVIN, NIACINAMIDE, PYRIDOXINE HYDROCHLORIDE, FOLIC ACID, CYANOCOBALAMIN, CALCIUM CARBONATE, FERROUS FUMARATE, ZINC OXIDE, AND CUPRIC OXIDE 2000; 2000; 120; 400; 22; 1.84; 3; 20; 10; 1; 12; 200; 27; 25; 2 [IU]/1; [IU]/1; MG/1; [IU]/1; MG/1; MG/1; MG/1; MG/1; MG/1; MG/1; UG/1; MG/1; MG/1; MG/1; MG/1
1 TABLET ORAL DAILY
Refills: 12 | COMMUNITY
Start: 2019-08-06 | End: 2020-08-13 | Stop reason: ALTCHOICE

## 2019-10-29 RX ORDER — BUPROPION HYDROCHLORIDE 75 MG/1
75 TABLET ORAL DAILY
COMMUNITY
End: 2019-10-29

## 2019-10-30 LAB
25(OH)D3+25(OH)D2 SERPL-MCNC: 52.8 NG/ML (ref 30–100)
ALBUMIN SERPL-MCNC: 3.9 G/DL (ref 3.5–5.2)
ALBUMIN/GLOB SERPL: 1.6 G/DL
ALP SERPL-CCNC: 92 U/L (ref 39–117)
ALT SERPL-CCNC: 11 U/L (ref 1–33)
AST SERPL-CCNC: 13 U/L (ref 1–32)
BILIRUB SERPL-MCNC: 0.2 MG/DL (ref 0.2–1.2)
BUN SERPL-MCNC: 8 MG/DL (ref 6–20)
BUN/CREAT SERPL: 11.8 (ref 7–25)
C PEPTIDE SERPL-MCNC: 5.5 NG/ML (ref 1.1–4.4)
CALCIUM SERPL-MCNC: 8.9 MG/DL (ref 8.6–10.5)
CHLORIDE SERPL-SCNC: 102 MMOL/L (ref 98–107)
CO2 SERPL-SCNC: 23.2 MMOL/L (ref 22–29)
CREAT SERPL-MCNC: 0.68 MG/DL (ref 0.57–1)
FRUCTOSAMINE SERPL-SCNC: 177 UMOL/L (ref 0–285)
GLOBULIN SER CALC-MCNC: 2.5 GM/DL
GLUCOSE SERPL-MCNC: 82 MG/DL (ref 65–99)
HBA1C MFR BLD: 5.1 % (ref 4.8–5.6)
INSULIN SERPL-ACNC: 29 UIU/ML (ref 2.6–24.9)
POTASSIUM SERPL-SCNC: 4.4 MMOL/L (ref 3.5–5.2)
PROT SERPL-MCNC: 6.4 G/DL (ref 6–8.5)
SODIUM SERPL-SCNC: 138 MMOL/L (ref 136–145)
T3 SERPL-MCNC: 233 NG/DL (ref 80–200)
T3FREE SERPL-MCNC: 3.3 PG/ML (ref 2–4.4)
T4 FREE SERPL-MCNC: 0.98 NG/DL (ref 0.93–1.7)
T4 SERPL-MCNC: 13.3 MCG/DL (ref 4.5–11.7)
THYROGLOB AB SERPL-ACNC: <1 IU/ML (ref 0–0.9)
THYROPEROXIDASE AB SERPL-ACNC: 15 IU/ML (ref 0–34)
TSH SERPL DL<=0.005 MIU/L-ACNC: 1.84 UIU/ML (ref 0.27–4.2)
URATE SERPL-MCNC: 5.3 MG/DL (ref 2.4–5.7)

## 2019-10-31 ENCOUNTER — RESULTS ENCOUNTER (OUTPATIENT)
Dept: ENDOCRINOLOGY | Age: 34
End: 2019-10-31

## 2019-10-31 DIAGNOSIS — E06.3 HASHIMOTO'S THYROIDITIS: ICD-10-CM

## 2019-10-31 DIAGNOSIS — E03.8 HYPOTHYROIDISM DUE TO HASHIMOTO'S THYROIDITIS: ICD-10-CM

## 2019-10-31 DIAGNOSIS — E06.3 HYPOTHYROIDISM DUE TO HASHIMOTO'S THYROIDITIS: ICD-10-CM

## 2019-10-31 DIAGNOSIS — E55.9 VITAMIN D DEFICIENCY: ICD-10-CM

## 2019-10-31 DIAGNOSIS — R53.82 CHRONIC FATIGUE: ICD-10-CM

## 2019-10-31 DIAGNOSIS — E03.9 PRIMARY HYPOTHYROIDISM: ICD-10-CM

## 2019-10-31 DIAGNOSIS — Z83.3 FAMILY HISTORY OF DIABETES MELLITUS (DM): ICD-10-CM

## 2019-11-20 DIAGNOSIS — E03.9 PRIMARY HYPOTHYROIDISM: ICD-10-CM

## 2019-11-20 RX ORDER — LIOTHYRONINE SODIUM 5 UG/1
5 TABLET ORAL DAILY
Qty: 90 TABLET | Refills: 0 | Status: SHIPPED | OUTPATIENT
Start: 2019-11-20 | End: 2020-02-21

## 2019-12-06 ENCOUNTER — OFFICE VISIT (OUTPATIENT)
Dept: ENDOCRINOLOGY | Age: 34
End: 2019-12-06

## 2019-12-06 VITALS
WEIGHT: 268.6 LBS | BODY MASS INDEX: 45.85 KG/M2 | DIASTOLIC BLOOD PRESSURE: 86 MMHG | HEIGHT: 64 IN | SYSTOLIC BLOOD PRESSURE: 130 MMHG

## 2019-12-06 DIAGNOSIS — E55.9 VITAMIN D DEFICIENCY: ICD-10-CM

## 2019-12-06 DIAGNOSIS — E03.8 HYPOTHYROIDISM DUE TO HASHIMOTO'S THYROIDITIS: Primary | ICD-10-CM

## 2019-12-06 DIAGNOSIS — E06.3 HYPOTHYROIDISM DUE TO HASHIMOTO'S THYROIDITIS: Primary | ICD-10-CM

## 2019-12-06 DIAGNOSIS — Z3A.35 35 WEEKS GESTATION OF PREGNANCY: ICD-10-CM

## 2019-12-06 PROCEDURE — 99214 OFFICE O/P EST MOD 30 MIN: CPT | Performed by: NURSE PRACTITIONER

## 2019-12-06 RX ORDER — LEVOTHYROXINE SODIUM 125 MCG
125 TABLET ORAL DAILY
Qty: 30 TABLET | Refills: 3 | Status: SHIPPED | OUTPATIENT
Start: 2019-12-06 | End: 2020-04-08 | Stop reason: SDUPTHER

## 2019-12-06 NOTE — PROGRESS NOTES
Subjective    Vivian Martínez is a 34 y.o. female. she is here to be seen for hypothyroidism.     Hypothyroid and pregnancy      Hypothyroidism   This is a chronic problem. The current episode started more than 1 month ago. The problem occurs constantly. The problem has been waxing and waning. Associated symptoms include fatigue. Pertinent negatives include no coughing, myalgias or rash. Nothing aggravates the symptoms. Treatments tried: meds and labs. The treatment provided mild relief.        Evaluation history:  TSH   Date Value Ref Range Status   10/29/2019 1.840 0.270 - 4.200 uIU/mL Final     Free T4   Date Value Ref Range Status   10/29/2019 0.98 0.93 - 1.70 ng/dL Final     T3, Free   Date Value Ref Range Status   10/29/2019 3.3 2.0 - 4.4 pg/mL Final       Current medications:  Current Outpatient Medications   Medication Sig Dispense Refill   • buPROPion XL (WELLBUTRIN XL) 300 MG 24 hr tablet TAKE 1 TABLET BY MOUTH EVERY DAY  2   • ferrous sulfate (IRON SUPPLEMENT) 325 (65 FE) MG tablet Take 325 mg by mouth 2 (Two) Times a Day.     • liothyronine (CYTOMEL) 5 MCG tablet TAKE 1 TABLET BY MOUTH DAILY 90 tablet 0   • Prenatal Vit-Fe Fumarate-FA (PNV PRENATAL PLUS MULTIVITAMIN) 27-1 MG tablet Take 1 tablet by mouth Daily. 200001 12   • vitamin D (ERGOCALCIFEROL) 38686 units capsule capsule Take 1 capsule by mouth 2 (Two) Times a Week. Take 1 capsule by mouth twice weekly 26 capsule 3   • SYNTHROID 125 MCG tablet Take 1 tablet by mouth Daily. 30 tablet 3     No current facility-administered medications for this visit.        The following portions of the patient's history were reviewed and updated as appropriate:   Past Medical History:   Diagnosis Date   • Hashimoto's thyroiditis      History reviewed. No pertinent surgical history.  Family History   Problem Relation Age of Onset   • Hypertension Mother    • Hyperlipidemia Mother    • Thyroid disease Mother    • Cancer Father    • Diabetes Father      OB History   "    Para Term  AB Living    1              SAB TAB Ectopic Molar Multiple Live Births                       No Known Allergies  Social History     Socioeconomic History   • Marital status:      Spouse name: Not on file   • Number of children: Not on file   • Years of education: Not on file   • Highest education level: Not on file   Tobacco Use   • Smoking status: Never Smoker   • Smokeless tobacco: Never Used   Substance and Sexual Activity   • Alcohol use: Yes     Comment: 1 drink every couple of months       Review of Systems  Review of Systems   Constitutional: Positive for fatigue.   HENT: Negative for trouble swallowing.    Eyes: Negative for visual disturbance.   Respiratory: Negative for cough.    Cardiovascular: Negative for palpitations.   Gastrointestinal: Positive for constipation.   Endocrine: Negative for cold intolerance.   Genitourinary: Negative for difficulty urinating.   Musculoskeletal: Negative for myalgias.   Skin: Negative for rash.   Neurological: Negative for light-headedness.   Hematological: Does not bruise/bleed easily.   Psychiatric/Behavioral: Positive for sleep disturbance.   All other systems reviewed and are negative.       Objective    /86   Ht 162.6 cm (64.02\")   Wt 122 kg (268 lb 9.6 oz)   BMI 46.08 kg/m²   Physical Exam   Constitutional: She is oriented to person, place, and time. She appears well-developed and well-nourished. No distress.   HENT:   Head: Normocephalic and atraumatic.   Eyes: EOM are normal. Pupils are equal, round, and reactive to light.   Neck: Normal range of motion. Neck supple.   Cardiovascular: Normal rate, regular rhythm, normal heart sounds and intact distal pulses.   No murmur heard.  Pulmonary/Chest: Effort normal and breath sounds normal.   Abdominal: Soft. Bowel sounds are normal.   Musculoskeletal: Normal range of motion.   Neurological: She is alert and oriented to person, place, and time.   Skin: Skin is warm and dry. " Capillary refill takes 2 to 3 seconds. She is not diaphoretic. No pallor.   Psychiatric: She has a normal mood and affect. Her behavior is normal. Judgment and thought content normal.   Nursing note and vitals reviewed.      Lab Review  Lab Results   Component Value Date    TSH 1.840 10/29/2019     Lab Results   Component Value Date    FREET4 0.98 10/29/2019        Assessment/Plan      1. Hypothyroidism due to Hashimoto's thyroiditis    2. Vitamin D deficiency    3. 35 weeks gestation of pregnancy    . This diagnosis was discussed and reviewed with the patient including the advantages of drug therapy.     1. Orders placed during this encounter include:  Orders Placed This Encounter   Procedures   • T4, Free     Standing Status:   Future   • TSH     Standing Status:   Future   • T3, Free     Standing Status:   Future       Medications prescribed:  Outpatient Encounter Medications as of 12/6/2019   Medication Sig Dispense Refill   • buPROPion XL (WELLBUTRIN XL) 300 MG 24 hr tablet TAKE 1 TABLET BY MOUTH EVERY DAY  2   • ferrous sulfate (IRON SUPPLEMENT) 325 (65 FE) MG tablet Take 325 mg by mouth 2 (Two) Times a Day.     • liothyronine (CYTOMEL) 5 MCG tablet TAKE 1 TABLET BY MOUTH DAILY 90 tablet 0   • Prenatal Vit-Fe Fumarate-FA (PNV PRENATAL PLUS MULTIVITAMIN) 27-1 MG tablet Take 1 tablet by mouth Daily. 200001 12   • vitamin D (ERGOCALCIFEROL) 61425 units capsule capsule Take 1 capsule by mouth 2 (Two) Times a Week. Take 1 capsule by mouth twice weekly 26 capsule 3   • [DISCONTINUED] SYNTHROID 137 MCG tablet Take 1 tablet by mouth Daily. 30 tablet 5   • SYNTHROID 125 MCG tablet Take 1 tablet by mouth Daily. 30 tablet 3     No facility-administered encounter medications on file as of 12/6/2019.        2. Repeat s-TSH in 6-8 weeks.    3. The risks and benefits of my recommendations, as well as other treatment options were discussed with the patient today. Questions were answered.    Vivian was seen today for  follow-up.    Diagnoses and all orders for this visit:     Hypothyroidism due to Hashimoto's thyroiditis- chronic, uncontrolled.  Medication changes were as follows  Recheck labs in 6 weeks.     -     SYNTHROID 125 MCG tablet; Take 1 tablet by mouth Daily.  -     T4, Free; Future  -     TSH; Future  -     T3, Free; Future    Vitamin D deficiency- chronic, stable no medication changes at this time. Refills prescribed. Future labs ordered for upcoming appointment and assessment.       35 weeks gestation of pregnancy        4. Return in about 3 months (around 3/6/2020), or if symptoms worsen or fail to improve, for Recheck. labs in 6 weeks. 3 months with Lydia-2 weeks prior for labs 6 months with Dr. Brown-2 weeks prior for labs

## 2019-12-18 ENCOUNTER — ANESTHESIA (OUTPATIENT)
Dept: LABOR AND DELIVERY | Facility: HOSPITAL | Age: 34
End: 2019-12-18

## 2019-12-18 ENCOUNTER — ANESTHESIA EVENT (OUTPATIENT)
Dept: LABOR AND DELIVERY | Facility: HOSPITAL | Age: 34
End: 2019-12-18

## 2019-12-18 ENCOUNTER — HOSPITAL ENCOUNTER (INPATIENT)
Facility: HOSPITAL | Age: 34
LOS: 3 days | Discharge: HOME | End: 2019-12-21
Attending: OBSTETRICS & GYNECOLOGY | Admitting: OBSTETRICS & GYNECOLOGY

## 2019-12-18 DIAGNOSIS — Z34.90 PREGNANCY, UNSPECIFIED GESTATIONAL AGE: Primary | ICD-10-CM

## 2019-12-18 LAB
ABO GROUP BLD: NORMAL
BASOPHILS # BLD AUTO: 0 10*3/MM3 (ref 0–0.2)
BASOPHILS NFR BLD AUTO: 0.3 % (ref 0–1.5)
BLD GP AB SCN SERPL QL: NEGATIVE
DEPRECATED RDW RBC AUTO: 48.1 FL (ref 37–54)
EOSINOPHIL # BLD AUTO: 0.2 10*3/MM3 (ref 0–0.4)
EOSINOPHIL NFR BLD AUTO: 1.2 % (ref 0.3–6.2)
ERYTHROCYTE [DISTWIDTH] IN BLOOD BY AUTOMATED COUNT: 15.6 % (ref 12.3–15.4)
HCT VFR BLD AUTO: 35.7 % (ref 34–46.6)
HGB BLD-MCNC: 12.2 G/DL (ref 12–15.9)
HIV1+2 AB SER QL: NORMAL
LYMPHOCYTES # BLD AUTO: 1.3 10*3/MM3 (ref 0.7–3.1)
LYMPHOCYTES NFR BLD AUTO: 9.9 % (ref 19.6–45.3)
MCH RBC QN AUTO: 30 PG (ref 26.6–33)
MCHC RBC AUTO-ENTMCNC: 34.3 G/DL (ref 31.5–35.7)
MCV RBC AUTO: 87.3 FL (ref 79–97)
MONOCYTES # BLD AUTO: 0.5 10*3/MM3 (ref 0.1–0.9)
MONOCYTES NFR BLD AUTO: 3.6 % (ref 5–12)
NEUTROPHILS # BLD AUTO: 11.2 10*3/MM3 (ref 1.7–7)
NEUTROPHILS NFR BLD AUTO: 85 % (ref 42.7–76)
NRBC BLD AUTO-RTO: 0.1 /100 WBC (ref 0–0.2)
PLATELET # BLD AUTO: 154 10*3/MM3 (ref 140–450)
PMV BLD AUTO: 8.9 FL (ref 6–12)
RBC # BLD AUTO: 4.09 10*6/MM3 (ref 3.77–5.28)
RH BLD: POSITIVE
T&S EXPIRATION DATE: NORMAL
WBC NRBC COR # BLD: 13.2 10*3/MM3 (ref 3.4–10.8)

## 2019-12-18 PROCEDURE — 86900 BLOOD TYPING SEROLOGIC ABO: CPT

## 2019-12-18 PROCEDURE — 25010000003 PENICILLIN G POTASSIUM PER 600000 UNITS: Performed by: OBSTETRICS & GYNECOLOGY

## 2019-12-18 PROCEDURE — G0432 EIA HIV-1/HIV-2 SCREEN: HCPCS | Performed by: OBSTETRICS & GYNECOLOGY

## 2019-12-18 PROCEDURE — 86901 BLOOD TYPING SEROLOGIC RH(D): CPT

## 2019-12-18 PROCEDURE — 25010000002 PENICILLIN G POTASSIUM PER 600000 UNITS: Performed by: OBSTETRICS & GYNECOLOGY

## 2019-12-18 PROCEDURE — 86850 RBC ANTIBODY SCREEN: CPT | Performed by: OBSTETRICS & GYNECOLOGY

## 2019-12-18 PROCEDURE — 86901 BLOOD TYPING SEROLOGIC RH(D): CPT | Performed by: OBSTETRICS & GYNECOLOGY

## 2019-12-18 PROCEDURE — 86900 BLOOD TYPING SEROLOGIC ABO: CPT | Performed by: OBSTETRICS & GYNECOLOGY

## 2019-12-18 PROCEDURE — 85025 COMPLETE CBC W/AUTO DIFF WBC: CPT | Performed by: OBSTETRICS & GYNECOLOGY

## 2019-12-18 PROCEDURE — 86592 SYPHILIS TEST NON-TREP QUAL: CPT | Performed by: OBSTETRICS & GYNECOLOGY

## 2019-12-18 RX ORDER — BUTORPHANOL TARTRATE 1 MG/ML
2 INJECTION, SOLUTION INTRAMUSCULAR; INTRAVENOUS
Status: DISCONTINUED | OUTPATIENT
Start: 2019-12-18 | End: 2019-12-18

## 2019-12-18 RX ORDER — SODIUM CHLORIDE 0.9 % (FLUSH) 0.9 %
10 SYRINGE (ML) INJECTION AS NEEDED
Status: DISCONTINUED | OUTPATIENT
Start: 2019-12-18 | End: 2019-12-19

## 2019-12-18 RX ORDER — FENTANYL CITRATE 50 UG/ML
INJECTION, SOLUTION INTRAMUSCULAR; INTRAVENOUS
Status: COMPLETED | OUTPATIENT
Start: 2019-12-18 | End: 2019-12-18

## 2019-12-18 RX ORDER — MAGNESIUM CARB/ALUMINUM HYDROX 105-160MG
30 TABLET,CHEWABLE ORAL ONCE
Status: DISCONTINUED | OUTPATIENT
Start: 2019-12-18 | End: 2019-12-18

## 2019-12-18 RX ORDER — BUTORPHANOL TARTRATE 1 MG/ML
1 INJECTION, SOLUTION INTRAMUSCULAR; INTRAVENOUS
Status: DISCONTINUED | OUTPATIENT
Start: 2019-12-18 | End: 2019-12-19

## 2019-12-18 RX ORDER — LIDOCAINE HYDROCHLORIDE 10 MG/ML
5 INJECTION, SOLUTION EPIDURAL; INFILTRATION; INTRACAUDAL; PERINEURAL AS NEEDED
Status: DISCONTINUED | OUTPATIENT
Start: 2019-12-18 | End: 2019-12-18

## 2019-12-18 RX ORDER — BUPROPION HYDROCHLORIDE 75 MG/1
75 TABLET ORAL DAILY
COMMUNITY

## 2019-12-18 RX ORDER — FENTANYL CITRATE 50 UG/ML
INJECTION, SOLUTION INTRAMUSCULAR; INTRAVENOUS
Status: DISPENSED
Start: 2019-12-18 | End: 2019-12-19

## 2019-12-18 RX ORDER — SODIUM CHLORIDE, SODIUM LACTATE, POTASSIUM CHLORIDE, CALCIUM CHLORIDE 600; 310; 30; 20 MG/100ML; MG/100ML; MG/100ML; MG/100ML
125 INJECTION, SOLUTION INTRAVENOUS CONTINUOUS
Status: DISCONTINUED | OUTPATIENT
Start: 2019-12-18 | End: 2019-12-19

## 2019-12-18 RX ORDER — MAGNESIUM CARB/ALUMINUM HYDROX 105-160MG
30 TABLET,CHEWABLE ORAL ONCE AS NEEDED
Status: DISCONTINUED | OUTPATIENT
Start: 2019-12-18 | End: 2019-12-19

## 2019-12-18 RX ORDER — SODIUM CHLORIDE 0.9 % (FLUSH) 0.9 %
3 SYRINGE (ML) INJECTION EVERY 12 HOURS SCHEDULED
Status: DISCONTINUED | OUTPATIENT
Start: 2019-12-18 | End: 2019-12-18

## 2019-12-18 RX ORDER — LIDOCAINE HYDROCHLORIDE 10 MG/ML
50 INJECTION, SOLUTION INFILTRATION; PERINEURAL ONCE AS NEEDED
Status: DISCONTINUED | OUTPATIENT
Start: 2019-12-18 | End: 2019-12-19

## 2019-12-18 RX ORDER — OXYTOCIN-SODIUM CHLORIDE 0.9% IV SOLN 30 UNIT/500ML 30-0.9/5 UT/ML-%
2-30 SOLUTION INTRAVENOUS
Status: DISCONTINUED | OUTPATIENT
Start: 2019-12-18 | End: 2019-12-19

## 2019-12-18 RX ADMIN — SODIUM CHLORIDE, SODIUM LACTATE, POTASSIUM CHLORIDE, AND CALCIUM CHLORIDE 125 ML/HR: 600; 310; 30; 20 INJECTION, SOLUTION INTRAVENOUS at 21:10

## 2019-12-18 RX ADMIN — SODIUM CHLORIDE, SODIUM LACTATE, POTASSIUM CHLORIDE, AND CALCIUM CHLORIDE 125 ML/HR: 600; 310; 30; 20 INJECTION, SOLUTION INTRAVENOUS at 12:25

## 2019-12-18 RX ADMIN — OXYTOCIN 2 MILLI-UNITS/MIN: 10 INJECTION INTRAVENOUS at 12:57

## 2019-12-18 RX ADMIN — SODIUM CHLORIDE 2.5 MILLION UNITS: 9 INJECTION, SOLUTION INTRAVENOUS at 17:06

## 2019-12-18 RX ADMIN — ROPIVACAINE HYDROCHLORIDE 10 ML/HR: 2 INJECTION, SOLUTION EPIDURAL; INFILTRATION at 18:13

## 2019-12-18 RX ADMIN — SODIUM CHLORIDE, SODIUM LACTATE, POTASSIUM CHLORIDE, AND CALCIUM CHLORIDE 1000 ML: 600; 310; 30; 20 INJECTION, SOLUTION INTRAVENOUS at 18:06

## 2019-12-18 RX ADMIN — SODIUM CHLORIDE 2.5 MILLION UNITS: 9 INJECTION, SOLUTION INTRAVENOUS at 21:33

## 2019-12-18 RX ADMIN — FENTANYL CITRATE 25 MCG: 50 INJECTION, SOLUTION INTRAMUSCULAR; INTRAVENOUS at 18:12

## 2019-12-18 RX ADMIN — SODIUM CHLORIDE 5 MILLION UNITS: 900 INJECTION INTRAVENOUS at 12:53

## 2019-12-18 NOTE — H&P
JACKSON Arana  Obstetric History and Physical     Chief Complaint: Spontaneous rupture of membranes    Subjective     Patient is a 34 y.o. female  currently at 37w3d by dates and scan , who presents with continuous rupture membranes and onset of contractions.    Prenatal care was uncomplicated    Prenatal Information:  Prenatal Results     POC Urine Glucose/Protein     Test Value Reference Range Date Time    Urine Glucose        Urine Protein              Initial Prenatal Labs     Test Value Reference Range Date Time    Hemoglobin        Hematocrit        Platelets 154 10*3/mm3 140 - 450 19 1229    Rubella IgG Immune   19     Hepatitis B SAg Negative   19     Hepatitis C Ab neg   19     RPR Non-Reactive   19     ABO O   19 1229    Rh Positive   19 1229    Antibody Screen        HIV Non-Reactive  Non-Reactive 19 1229      Negative   19     Urine Culture        Gonorrhea        Chlamydia        TSH 1.840 uIU/mL 0.270 - 4.200 10/29/19 1257      1.660 mIU/mL 0.270 - 4.200 19 1540          2nd and 3rd Trimester     Test Value Reference Range Date Time    Hemoglobin (repeated) 12.2 g/dL 12.0 - 15.9 19 1229    Hematocrit (repeated) 35.7 % 34.0 - 46.6 19 1229    GCT        Antibody Screen (repeated) Negative   19 1229    GTT Fasting        GTT 1 Hr        GTT 2 Hr        GTT 3 Hr        Group B Strep POS   19           Drug Screening     Test Value Reference Range Date Time    Amphetamine Screen        Barbiturate Screen        Benzodiazepine Screen        Methadone Screen        Phencyclidine Screen        Opiates Screen        THC Screen        Cocaine Screen        Propoxyphene Screen        Buprenorphine Screen        Methamphetamine Screen        Oxycodone Screen        Tricyclic Antidepressants Screen              Other (Risk screening)     Test Value Reference Range Date Time    Varicella IgG        Parvovirus IgG        CMV IgG         Cystic Fibrosis        Hemoglobin electrophoresis        NIPT        MSAFP-4        AFP (for NTD only)                  External Prenatal Results     Pregnancy Outside Results - Transcribed From Office Records - See Scanned Records For Details     Test Value Date Time    Hgb 12.2 g/dL 12/18/19 1229    Hct 35.7 % 12/18/19 1229    ABO O  12/18/19 1229    Rh Positive  12/18/19 1229    Antibody Screen Negative  12/18/19 1229    Glucose Fasting GTT       Glucose Tolerance Test 1 hour       Glucose Tolerance Test 3 hour       Gonorrhea (discrete)       Chlamydia (discrete)       RPR Non-Reactive  05/23/19     VDRL       Syphilis Antibody       Rubella Immune  05/23/19     HBsAg Negative  05/23/19     Herpes Simplex Virus PCR       Herpes Simplex VIrus Culture       HIV Non-Reactive  12/18/19 1229      Negative  05/23/19     Hep C RNA Quant PCR       Hep C Antibody neg  05/23/19     AFP       Group B Strep POS  05/23/19     GBS Susceptibility to Clindamycin       GBS Susceptibility to Erythromycin       Fetal Fibronectin       Genetic Testing, Maternal Blood             Drug Screening     Test Value Date Time    Urine Drug Screen       Amphetamine Screen       Barbiturate Screen       Benzodiazepine Screen       Methadone Screen       Phencyclidine Screen       Opiates Screen       THC Screen       Cocaine Screen       Propoxyphene Screen       Buprenorphine Screen       Methamphetamine Screen       Oxycodone Screen       Tricyclic Antidepressants Screen                    Past OB History:         Past Medical History: Past Medical History:   Diagnosis Date   • Depression    • Hashimoto's thyroiditis    • HPV (human papilloma virus) infection     2013         Past Surgical History History reviewed. No pertinent surgical history.      Family History: Family History   Problem Relation Age of Onset   • Hypertension Mother    • Hyperlipidemia Mother    • Thyroid disease Mother    • Cancer Father    • Diabetes Father        Social History:  reports that she has never smoked. She has never used smokeless tobacco.   reports that she drank alcohol.   reports that she does not use drugs.        General ROS: Pertinent items are noted in HPI    Objective      Vitals:     Vitals:    12/18/19 1500 12/18/19 1530 12/18/19 1600 12/18/19 1630   BP: 115/76 122/79 122/76 124/85   Pulse: 102 100 107 102   Resp:       Temp:   98.3 °F (36.8 °C)    TempSrc:   Oral    SpO2:       Weight:       Height:           Fetal Heart Rate Assessment:   Category 1    Gracey:   Regular mild     Physical Exam:     General Appearance:    Alert, cooperative, in no acute distress   Lungs:     Clear to auscultation,respirations regular.    Heart:    Regular rhythm and normal rate.   Breast Exam:    Deferred   Abdomen:     Normal bowel sounds, no masses, soft non-tender,          non-distended, no guarding, no rebound tenderness   Pelvic Exam:         Presentation: Vertex    Cervix: 2 to 3/80%/-1.  Artificial rupture of membranes at 5 PM the bulging fore bag being noted on exam   Extremities:   Moves all extremities well, no edema, no cyanosis, no              redness   Skin:   No bleeding, bruising or rash   Neurologic:   No focal neurologic defect          Laboratory Results:   Lab Results (last 48 hours)     Procedure Component Value Units Date/Time    HIV-1 & HIV-2 Antibodies [[id number]]  (Normal) Collected:  12/18/19 1229    Specimen:  Blood Updated:  12/18/19 1343     HIV-1/ HIV-2 Non-Reactive     Comment: A non-reactive test result does not preclude the possibility of exposure to HIV or infection with HIV. An antibody response to recent exposure may take several months to reach detectable levels.       Narrative:       The HIV antibody/antigen combo assay is a qualitative assay for HIV that includes the p24 antigen as well as antibodies to HIV types 1 and 2. This test is intended to be used as a screening assay in the diagnosis of HIV infection in patients over the  age of 2.    CBC & Differential [[id number]] Collected:  12/18/19 1229    Specimen:  Blood Updated:  12/18/19 1300    Narrative:       The following orders were created for panel order CBC & Differential.  Procedure                               Abnormality         Status                     ---------                               -----------         ------                     CBC Auto Differential[[id number]]        Abnormal            Final result                 Please view results for these tests on the individual orders.    CBC Auto Differential [[id number]]  (Abnormal) Collected:  12/18/19 1229    Specimen:  Blood Updated:  12/18/19 1300     WBC 13.20 10*3/mm3      RBC 4.09 10*6/mm3      Hemoglobin 12.2 g/dL      Hematocrit 35.7 %      MCV 87.3 fL      MCH 30.0 pg      MCHC 34.3 g/dL      RDW 15.6 %      RDW-SD 48.1 fl      MPV 8.9 fL      Platelets 154 10*3/mm3      Neutrophil % 85.0 %      Lymphocyte % 9.9 %      Monocyte % 3.6 %      Eosinophil % 1.2 %      Basophil % 0.3 %      Neutrophils, Absolute 11.20 10*3/mm3      Lymphocytes, Absolute 1.30 10*3/mm3      Monocytes, Absolute 0.50 10*3/mm3      Eosinophils, Absolute 0.20 10*3/mm3      Basophils, Absolute 0.00 10*3/mm3      nRBC 0.1 /100 WBC     RPR [[id number]] Collected:  12/18/19 1229    Specimen:  Blood Updated:  12/18/19 1254    Hepatitis C Antibody [[id number]] Resulted:  05/23/19     Specimen:  Blood Updated:  12/18/19 1146     External Hepatitis C Ab neg    Hepatitis B Surface Antigen [[id number]] Resulted:  05/23/19     Specimen:  Blood Updated:  12/18/19 1146     External Hepatitis B Surface Ag Negative    RPR [[id number]] Resulted:  05/23/19     Specimen:  Blood Updated:  12/18/19 1146     External RPR Non-Reactive    Rubella Antibody, IgG [[id number]] Resulted:  05/23/19     Specimen:  Blood Updated:  12/18/19 1146     External Rubella Qual Immune    HIV-1 Antibody, EIA [[id number]] Resulted:  05/23/19     Specimen:  Blood Updated:  12/18/19 1146      External HIV Antibody Negative    Group B Streptococcus Culture - Swab, Vaginal/Rectum [[id number]] Resulted:  05/23/19     Specimen:  Swab from Vaginal/Rectum Updated:  12/18/19 1146     External Strep Group B Ag POS          Other Studies:       Assessment/Plan     Active Problems:    Pregnancy         Assessment:  37-week intrauterine pregnancy.  Spontaneous rupture membranes  4.  GBS status:   External Strep Group B Ag   Date Value Ref Range Status   05/23/2019 POS  Final       Plan:  Admit for delivery       Naveed Rivera MD   12/18/2019   4:55 PM

## 2019-12-18 NOTE — ANESTHESIA PROCEDURE NOTES
CSE Block      Patient reassessed immediately prior to procedure    Patient location during procedure: OB  Start time: 12/18/2019 5:55 PM  End time: 12/18/2019 6:12 PM  Reason for block: at surgeon's request  Staffing  Anesthesiologist: Quinton Hastings MD  Preanesthetic Checklist  Completed: patient identified, site marked, surgical consent, pre-op evaluation, timeout performed, IV checked, risks and benefits discussed and monitors and equipment checked  CSE  Patient position: sitting  Patient monitoring: blood pressure monitoring and continuous pulse oximetry  Procedures: landmark technique and palpation technique  Spinal Needle  Needle type: Vonda (Through Tuohy)   Needle gauge: 27 G  Approach: midline  Location: L3-4  Fluid Appearance: clear  Spinal medications used: fentaNYL (SUBLIMAZE) injection, 25 mcg  Epidural Needle  Injection technique: SANGEETA air  Needle type: Tuohy   Needle gauge: 18 G  Location: L3-L4  Level: 3-4  Loss of Resistance: 6cm  Cath Depth at Skin (cm): 12  Aspiration: negative  Test dose: negative      Catheter  Catheter type: end hole  Catheter size: 20 G  Assessment  Dressing:occlusive dressing applied and secured with tape  Pt Tolerance:patient tolerated the procedure well with no apparent complications  Complications:no

## 2019-12-18 NOTE — ANESTHESIA PREPROCEDURE EVALUATION
Anesthesia Evaluation     Patient summary reviewed and Nursing notes reviewed   NPO Solid Status: > 6 hours  NPO Liquid Status: > 6 hours           Airway   Mallampati: II  TM distance: >3 FB  Neck ROM: full  No difficulty expected  Dental - normal exam     Pulmonary - negative pulmonary ROS and normal exam    breath sounds clear to auscultation  Cardiovascular - negative cardio ROS and normal exam    ECG reviewed  Rhythm: regular  Rate: normal        Neuro/Psych  (+) psychiatric history,     GI/Hepatic/Renal/Endo - negative ROS     Musculoskeletal (-) negative ROS    Abdominal   (+) obese,     Abdomen: soft.  Bowel sounds: normal.   Substance History - negative use     OB/GYN    (+) Pregnant,         Other - negative ROS                     Anesthesia Plan    ASA 3     CSE     intravenous induction     Anesthetic plan, all risks, benefits, and alternatives have been provided, discussed and informed consent has been obtained with: patient.  Use of blood products discussed with patient .

## 2019-12-19 LAB — RPR SER QL: NORMAL

## 2019-12-19 PROCEDURE — 25010000003 PENICILLIN G POTASSIUM PER 600000 UNITS: Performed by: OBSTETRICS & GYNECOLOGY

## 2019-12-19 PROCEDURE — 25010000002 FENTANYL CITRATE (PF) 100 MCG/2ML SOLUTION: Performed by: ANESTHESIOLOGY

## 2019-12-19 PROCEDURE — 25010000002 METHYLERGONOVINE MALEATE PER 0.2 MG: Performed by: OBSTETRICS & GYNECOLOGY

## 2019-12-19 PROCEDURE — 25010000002 ONDANSETRON PER 1 MG

## 2019-12-19 PROCEDURE — 0KQM0ZZ REPAIR PERINEUM MUSCLE, OPEN APPROACH: ICD-10-PCS | Performed by: OBSTETRICS & GYNECOLOGY

## 2019-12-19 PROCEDURE — 25010000002 ROPIVACAINE PER 1 MG: Performed by: ANESTHESIOLOGY

## 2019-12-19 PROCEDURE — 25010000002 FENTANYL CITRATE (PF) 1000 MCG/20ML SOLUTION: Performed by: ANESTHESIOLOGY

## 2019-12-19 RX ORDER — LIOTHYRONINE SODIUM 5 UG/1
5 TABLET ORAL DAILY
Status: DISCONTINUED | OUTPATIENT
Start: 2019-12-19 | End: 2019-12-21 | Stop reason: HOSPADM

## 2019-12-19 RX ORDER — ONDANSETRON 2 MG/ML
4 INJECTION INTRAMUSCULAR; INTRAVENOUS EVERY 6 HOURS PRN
Status: DISCONTINUED | OUTPATIENT
Start: 2019-12-19 | End: 2019-12-21 | Stop reason: HOSPADM

## 2019-12-19 RX ORDER — DOCUSATE SODIUM 100 MG/1
100 CAPSULE, LIQUID FILLED ORAL 2 TIMES DAILY
Status: DISCONTINUED | OUTPATIENT
Start: 2019-12-19 | End: 2019-12-21 | Stop reason: HOSPADM

## 2019-12-19 RX ORDER — CARBOPROST TROMETHAMINE 250 UG/ML
250 INJECTION, SOLUTION INTRAMUSCULAR AS NEEDED
Status: DISCONTINUED | OUTPATIENT
Start: 2019-12-19 | End: 2019-12-19

## 2019-12-19 RX ORDER — ONDANSETRON 4 MG/1
4 TABLET, FILM COATED ORAL EVERY 8 HOURS PRN
Status: DISCONTINUED | OUTPATIENT
Start: 2019-12-19 | End: 2019-12-21 | Stop reason: HOSPADM

## 2019-12-19 RX ORDER — LEVOTHYROXINE SODIUM 0.12 MG/1
125 TABLET ORAL
Status: DISCONTINUED | OUTPATIENT
Start: 2019-12-19 | End: 2019-12-21 | Stop reason: HOSPADM

## 2019-12-19 RX ORDER — OXYTOCIN-SODIUM CHLORIDE 0.9% IV SOLN 30 UNIT/500ML 30-0.9/5 UT/ML-%
125 SOLUTION INTRAVENOUS CONTINUOUS PRN
Status: DISCONTINUED | OUTPATIENT
Start: 2019-12-19 | End: 2019-12-19 | Stop reason: HOSPADM

## 2019-12-19 RX ORDER — HYDROCODONE BITARTRATE AND ACETAMINOPHEN 5; 325 MG/1; MG/1
1 TABLET ORAL EVERY 4 HOURS PRN
Status: DISCONTINUED | OUTPATIENT
Start: 2019-12-19 | End: 2019-12-21 | Stop reason: HOSPADM

## 2019-12-19 RX ORDER — IBUPROFEN 600 MG/1
600 TABLET ORAL EVERY 6 HOURS PRN
Status: DISCONTINUED | OUTPATIENT
Start: 2019-12-19 | End: 2019-12-21 | Stop reason: HOSPADM

## 2019-12-19 RX ORDER — ONDANSETRON 2 MG/ML
INJECTION INTRAMUSCULAR; INTRAVENOUS
Status: COMPLETED
Start: 2019-12-19 | End: 2019-12-19

## 2019-12-19 RX ORDER — LANOLIN 100 %
OINTMENT (GRAM) TOPICAL
Status: DISCONTINUED | OUTPATIENT
Start: 2019-12-19 | End: 2019-12-21 | Stop reason: HOSPADM

## 2019-12-19 RX ORDER — BUPROPION HYDROCHLORIDE 75 MG/1
75 TABLET ORAL DAILY
Status: DISCONTINUED | OUTPATIENT
Start: 2019-12-19 | End: 2019-12-21 | Stop reason: HOSPADM

## 2019-12-19 RX ORDER — OXYTOCIN-SODIUM CHLORIDE 0.9% IV SOLN 30 UNIT/500ML 30-0.9/5 UT/ML-%
999 SOLUTION INTRAVENOUS ONCE
Status: DISCONTINUED | OUTPATIENT
Start: 2019-12-19 | End: 2019-12-19 | Stop reason: HOSPADM

## 2019-12-19 RX ORDER — PRENATAL VIT/IRON FUM/FOLIC AC 27MG-0.8MG
1 TABLET ORAL DAILY
Status: DISCONTINUED | OUTPATIENT
Start: 2019-12-19 | End: 2019-12-21 | Stop reason: HOSPADM

## 2019-12-19 RX ORDER — MISOPROSTOL 200 UG/1
800 TABLET ORAL AS NEEDED
Status: DISCONTINUED | OUTPATIENT
Start: 2019-12-19 | End: 2019-12-19

## 2019-12-19 RX ORDER — BUPROPION HYDROCHLORIDE 150 MG/1
300 TABLET ORAL DAILY
Status: DISCONTINUED | OUTPATIENT
Start: 2019-12-19 | End: 2019-12-21 | Stop reason: HOSPADM

## 2019-12-19 RX ORDER — OXYTOCIN-SODIUM CHLORIDE 0.9% IV SOLN 30 UNIT/500ML 30-0.9/5 UT/ML-%
250 SOLUTION INTRAVENOUS CONTINUOUS
Status: ACTIVE | OUTPATIENT
Start: 2019-12-19 | End: 2019-12-19

## 2019-12-19 RX ORDER — METHYLERGONOVINE MALEATE 0.2 MG/ML
200 INJECTION INTRAVENOUS ONCE AS NEEDED
Status: COMPLETED | OUTPATIENT
Start: 2019-12-19 | End: 2019-12-19

## 2019-12-19 RX ADMIN — ONDANSETRON 4 MG: 2 INJECTION INTRAMUSCULAR; INTRAVENOUS at 05:08

## 2019-12-19 RX ADMIN — BUPROPION HYDROCHLORIDE 300 MG: 150 TABLET, EXTENDED RELEASE ORAL at 07:54

## 2019-12-19 RX ADMIN — METHYLERGONOVINE MALEATE 200 MCG: 0.2 INJECTION, SOLUTION INTRAMUSCULAR; INTRAVENOUS at 04:00

## 2019-12-19 RX ADMIN — SODIUM CHLORIDE 2.5 MILLION UNITS: 9 INJECTION, SOLUTION INTRAVENOUS at 02:02

## 2019-12-19 RX ADMIN — DOCUSATE SODIUM 100 MG: 100 CAPSULE, LIQUID FILLED ORAL at 07:53

## 2019-12-19 RX ADMIN — ROPIVACAINE HYDROCHLORIDE 10 ML/HR: 2 INJECTION, SOLUTION EPIDURAL; INFILTRATION at 03:03

## 2019-12-19 RX ADMIN — IBUPROFEN 600 MG: 600 TABLET, FILM COATED ORAL at 18:34

## 2019-12-19 RX ADMIN — DOCUSATE SODIUM 100 MG: 100 CAPSULE, LIQUID FILLED ORAL at 20:36

## 2019-12-19 RX ADMIN — HYDROCODONE BITARTRATE AND ACETAMINOPHEN 1 TABLET: 5; 325 TABLET ORAL at 11:35

## 2019-12-19 RX ADMIN — PRENATAL VIT W/ FE FUMARATE-FA TAB 27-0.8 MG 1 TABLET: 27-0.8 TAB at 07:54

## 2019-12-19 RX ADMIN — IBUPROFEN 600 MG: 600 TABLET, FILM COATED ORAL at 07:53

## 2019-12-19 RX ADMIN — LIOTHYRONINE SODIUM 5 MCG: 5 TABLET ORAL at 09:01

## 2019-12-19 RX ADMIN — WITCH HAZEL 1 PAD: 500 SOLUTION RECTAL; TOPICAL at 05:40

## 2019-12-19 RX ADMIN — BENZOCAINE 1 SPRAY: 11.4 AEROSOL, SPRAY TOPICAL at 05:40

## 2019-12-19 RX ADMIN — BUPROPION HYDROCHLORIDE 75 MG: 75 TABLET, FILM COATED ORAL at 09:01

## 2019-12-19 NOTE — PLAN OF CARE
Problem: Patient Care Overview  Goal: Plan of Care Review  Outcome: Ongoing (interventions implemented as appropriate)  Flowsheets (Taken 12/19/2019 8408)  Progress: improving  Plan of Care Reviewed With: patient  Outcome Summary: pain controlled with medications, provide assistance and support with breastfeeding as needed

## 2019-12-19 NOTE — PROGRESS NOTES
Exam    4cm/90/-1  FHT's Cat 2     Variable decles with stable baseline    Progression into active phase  Allow to labor on side with O2

## 2019-12-19 NOTE — LACTATION NOTE
This note was copied from a baby's chart.  Pt denies hx of breast surgery, no allergy tpo wool or foods.Medela gel patches provided, instructed on use   Teaching done, she has a Motiv-Aeroflow pump, BF dvd watched. Baby in nursery for bath. Will call for feeding obs.

## 2019-12-19 NOTE — L&D DELIVERY NOTE
JACKSON Arana  Vaginal Delivery Note    Diagnosis     Patient is a 34 y.o. female  currently at 37w4d, who presents with spontaneous rupture of membranes with onset of labor.    Followed by me prenatally since 7 weeks gestation.  Due date by last menstrual period and 12-week ultrasound.  History of hypothyroidism on Synthroid.  History of depression on Wellbutrin.  Group B strep screen positive    .      Delivery     Delivery:  Spontaneous Vaginal Delivery    Date of Delivery:  2019   Anesthesia: Epidural     Delivering clinician: Naveed Rivera MD      Delivery narrative: Presents with spontaneous rupture of membranes.  Placed on Pitocin for augmentation of labor.  Started on penicillin for group B strep prophylaxis.  Received an epidural for analgesia in the active phase of labor    Normal progression to the active phase of labor.  Reassuring fetal heart rate tracing throughout the active phase    Pushed for approximately 15 minutes with controlled delivery over an intact perineum.  The infant was bulb suctioned on the perineum and handed to waiting parents with good color tone and cry    Placenta delivered spontaneously intact.  Lochia was heavy required massage and IM Methergine for control    Second-degree midline tear was repaired with 3-0 repeat in the standard manner.    Estimated blood loss was between 750 cc and 1200 cc    Infant    Findings: VMI     Apgars:  8 and 9 at 1 and 5 minutes.           Placenta, Cord, and Fluid    Placenta delivered  spontaneous  clear   Episiotomy              n/a  Lacerations       2nd degree   Estimated Blood Loss 300cc     Complications  none            Naveed Rivera MD  19  3:58 AM

## 2019-12-19 NOTE — PLAN OF CARE
Problem: Patient Care Overview  Goal: Plan of Care Review  Outcome: Ongoing (interventions implemented as appropriate)  Flowsheets (Taken 12/19/2019 3490)  Progress: improving  Plan of Care Reviewed With: patient; spouse  Note:   Will continue to monitor.

## 2019-12-19 NOTE — ANESTHESIA POSTPROCEDURE EVALUATION
Patient: Vivian Martínez    Procedure Summary     Date:  12/18/19 Room / Location:      Anesthesia Start:  1754 Anesthesia Stop:  12/19/19 0350    Procedure:  LABOR ANALGESIA Diagnosis:      Scheduled Providers:   Provider:  Yordan Villar DO    Anesthesia Type:  CSE ASA Status:  3          Anesthesia Type: CSE    Vitals  Vitals Value Taken Time   /77 12/19/2019  4:46 AM   Temp 98.6 °F (37 °C) 12/19/2019  2:30 AM   Pulse 121 12/19/2019  4:46 AM   Resp 18 12/19/2019  4:15 AM   SpO2 97 % 12/19/2019  4:10 AM   Vitals shown include unvalidated device data.        Post Anesthesia Care and Evaluation    Patient location during evaluation: PACU  Patient participation: complete - patient participated  Level of consciousness: awake  Pain scale: See nurse's notes for pain score.  Pain management: adequate  Airway patency: patent  Anesthetic complications: No anesthetic complications  PONV Status: none  Cardiovascular status: acceptable  Respiratory status: acceptable  Hydration status: acceptable  Post Neuraxial Block status: Motor and sensory function returned to baseline and No signs or symptoms of PDPH  Comments: Patient seen and examined postoperatively; vital signs stable; SpO2 greater than or equal to 90%; cardiopulmonary status stable; nausea/vomiting adequately controlled; pain adequately controlled; no apparent anesthesia complications; patient discharged from anesthesia care when discharge criteria were met

## 2019-12-20 LAB
BASOPHILS # BLD AUTO: 0 10*3/MM3 (ref 0–0.2)
BASOPHILS NFR BLD AUTO: 0.2 % (ref 0–1.5)
DEPRECATED RDW RBC AUTO: 48.6 FL (ref 37–54)
EOSINOPHIL # BLD AUTO: 0.2 10*3/MM3 (ref 0–0.4)
EOSINOPHIL NFR BLD AUTO: 1.6 % (ref 0.3–6.2)
ERYTHROCYTE [DISTWIDTH] IN BLOOD BY AUTOMATED COUNT: 15.7 % (ref 12.3–15.4)
HCT VFR BLD AUTO: 24.8 % (ref 34–46.6)
HGB BLD-MCNC: 8.3 G/DL (ref 12–15.9)
LYMPHOCYTES # BLD AUTO: 1.8 10*3/MM3 (ref 0.7–3.1)
LYMPHOCYTES NFR BLD AUTO: 13.7 % (ref 19.6–45.3)
MCH RBC QN AUTO: 29.6 PG (ref 26.6–33)
MCHC RBC AUTO-ENTMCNC: 33.3 G/DL (ref 31.5–35.7)
MCV RBC AUTO: 88.7 FL (ref 79–97)
MONOCYTES # BLD AUTO: 0.7 10*3/MM3 (ref 0.1–0.9)
MONOCYTES NFR BLD AUTO: 5.3 % (ref 5–12)
NEUTROPHILS # BLD AUTO: 10.5 10*3/MM3 (ref 1.7–7)
NEUTROPHILS NFR BLD AUTO: 79.2 % (ref 42.7–76)
NRBC BLD AUTO-RTO: 0 /100 WBC (ref 0–0.2)
PLATELET # BLD AUTO: 147 10*3/MM3 (ref 140–450)
PMV BLD AUTO: 8.6 FL (ref 6–12)
RBC # BLD AUTO: 2.8 10*6/MM3 (ref 3.77–5.28)
WBC NRBC COR # BLD: 13.3 10*3/MM3 (ref 3.4–10.8)

## 2019-12-20 PROCEDURE — 85025 COMPLETE CBC W/AUTO DIFF WBC: CPT | Performed by: OBSTETRICS & GYNECOLOGY

## 2019-12-20 RX ORDER — FERROUS SULFATE TAB EC 324 MG (65 MG FE EQUIVALENT) 324 (65 FE) MG
324 TABLET DELAYED RESPONSE ORAL 2 TIMES DAILY WITH MEALS
Status: DISCONTINUED | OUTPATIENT
Start: 2019-12-20 | End: 2019-12-21 | Stop reason: HOSPADM

## 2019-12-20 RX ADMIN — PRENATAL VIT W/ FE FUMARATE-FA TAB 27-0.8 MG 1 TABLET: 27-0.8 TAB at 09:11

## 2019-12-20 RX ADMIN — IBUPROFEN 600 MG: 600 TABLET, FILM COATED ORAL at 18:36

## 2019-12-20 RX ADMIN — LEVOTHYROXINE SODIUM 125 MCG: 125 TABLET ORAL at 09:08

## 2019-12-20 RX ADMIN — BUPROPION HYDROCHLORIDE 300 MG: 150 TABLET, EXTENDED RELEASE ORAL at 09:12

## 2019-12-20 RX ADMIN — FERROUS SULFATE TAB EC 324 MG (65 MG FE EQUIVALENT) 324 MG: 324 (65 FE) TABLET DELAYED RESPONSE at 18:30

## 2019-12-20 RX ADMIN — DOCUSATE SODIUM 100 MG: 100 CAPSULE, LIQUID FILLED ORAL at 19:32

## 2019-12-20 RX ADMIN — DOCUSATE SODIUM 100 MG: 100 CAPSULE, LIQUID FILLED ORAL at 09:12

## 2019-12-20 RX ADMIN — LIOTHYRONINE SODIUM 5 MCG: 5 TABLET ORAL at 09:09

## 2019-12-20 RX ADMIN — FERROUS SULFATE TAB EC 324 MG (65 MG FE EQUIVALENT) 324 MG: 324 (65 FE) TABLET DELAYED RESPONSE at 11:35

## 2019-12-20 RX ADMIN — IBUPROFEN 600 MG: 600 TABLET, FILM COATED ORAL at 03:20

## 2019-12-20 RX ADMIN — BUPROPION HYDROCHLORIDE 75 MG: 75 TABLET, FILM COATED ORAL at 09:07

## 2019-12-20 RX ADMIN — HYDROCODONE BITARTRATE AND ACETAMINOPHEN 1 TABLET: 5; 325 TABLET ORAL at 19:32

## 2019-12-20 RX ADMIN — IBUPROFEN 600 MG: 600 TABLET, FILM COATED ORAL at 09:29

## 2019-12-20 NOTE — PROGRESS NOTES
JACKSON Arana  Postpartum Note    Subjective   Postpartum Day 1:  Spontaneous Vaginal Delivery    Patient without complaints. Her pain is well controlled with nonsteroidal anti-inflammatory drugs and prescribed pain medications. She is ambulating well.  Patient describes her bleeding as thin lochia.     Breastfeeding: infant latching without difficulty.    Objective     Vitals:  Vitals:    12/19/19 1855 12/19/19 2300 12/20/19 0300 12/20/19 0750   BP: 101/68 120/82 98/66 106/73   BP Location: Right arm Right arm Left arm Left arm   Patient Position: Lying Sitting Lying Sitting   Pulse: 104 96 99 99   Resp: 16 18 16 18   Temp: 98.5 °F (36.9 °C) 98.2 °F (36.8 °C) 98.4 °F (36.9 °C) 97.5 °F (36.4 °C)   TempSrc: Oral Oral Oral Oral   SpO2: 99% 99% 98% 99%   Weight:       Height:           Physical Exam:  General:  Alert and oriented x3. No acute distress.  Abdomen: abdomen is soft without significant tenderness, masses, organomegaly or guarding. Fundus: appropriate, firm, non tender  Incision: N/A  Skin: Warm, Dry  Extremities: Normal,  trace edema. Nontender     Labs:  Results from last 7 days   Lab Units 12/20/19  0622 12/18/19  1229   WBC 10*3/mm3 13.30* 13.20*   HEMOGLOBIN g/dL 8.3* 12.2   HEMATOCRIT % 24.8* 35.7   PLATELETS 10*3/mm3 147 154            Feeding method: Breastfeeding Status: Yes     Blood Type: RH Positive        Assessment/Plan     Active Problems:    Pregnancy      Vivian Martínez is Day 1  post-partum from a  Spontaneous Vaginal Delivery    Anemia; Hgb 8.3 down from 12.2. Pt asymptomatic    Plan:  routine, repeat CBC in AM and plan d/c tomorrow.       Cecille Valencia, JOSESITO  12/20/2019  9:50 AM

## 2019-12-20 NOTE — LACTATION NOTE
This note was copied from a baby's chart.  Mother reports baby has been sleepy and not wanting to latch. Instructed on ways to wake baby. Assisted with football hold. Colostrum expressed. With good areola compression and hold, baby latched and fed with audible swallowing for 20mins. Mother reported increase in uterine cramping as feeding progressed. Significant other eager to learn and help. Instructed on gentle stimulation to keep baby feeding. Mother reports she has read thru info packet. Encouraged to call as needed.

## 2019-12-20 NOTE — PLAN OF CARE
Pt tolerating pain with motrin. Pt breastfeeding well. Pt gets up and ambulates independently. Spouse at bedside helping with care.

## 2019-12-21 VITALS
DIASTOLIC BLOOD PRESSURE: 60 MMHG | HEART RATE: 87 BPM | HEIGHT: 65 IN | SYSTOLIC BLOOD PRESSURE: 103 MMHG | OXYGEN SATURATION: 98 % | TEMPERATURE: 98.5 F | BODY MASS INDEX: 44.41 KG/M2 | WEIGHT: 266.54 LBS | RESPIRATION RATE: 16 BRPM

## 2019-12-21 LAB
DEPRECATED RDW RBC AUTO: 49.9 FL (ref 37–54)
EOSINOPHIL # BLD MANUAL: 0.13 10*3/MM3 (ref 0–0.4)
EOSINOPHIL NFR BLD MANUAL: 1 % (ref 0.3–6.2)
ERYTHROCYTE [DISTWIDTH] IN BLOOD BY AUTOMATED COUNT: 15.6 % (ref 12.3–15.4)
HCT VFR BLD AUTO: 26.3 % (ref 34–46.6)
HGB BLD-MCNC: 8.7 G/DL (ref 12–15.9)
LARGE PLATELETS: ABNORMAL
LYMPHOCYTES # BLD MANUAL: 1.2 10*3/MM3 (ref 0.7–3.1)
LYMPHOCYTES NFR BLD MANUAL: 9 % (ref 19.6–45.3)
MCH RBC QN AUTO: 30.1 PG (ref 26.6–33)
MCHC RBC AUTO-ENTMCNC: 33.1 G/DL (ref 31.5–35.7)
MCV RBC AUTO: 90.9 FL (ref 79–97)
NEUTROPHILS # BLD AUTO: 11.97 10*3/MM3 (ref 1.7–7)
NEUTROPHILS NFR BLD MANUAL: 79 % (ref 42.7–76)
NEUTS BAND NFR BLD MANUAL: 11 % (ref 0–5)
PLATELET # BLD AUTO: 192 10*3/MM3 (ref 140–450)
PMV BLD AUTO: 8.7 FL (ref 6–12)
RBC # BLD AUTO: 2.89 10*6/MM3 (ref 3.77–5.28)
RBC MORPH BLD: NORMAL
SCAN SLIDE: NORMAL
WBC MORPH BLD: NORMAL
WBC NRBC COR # BLD: 13.3 10*3/MM3 (ref 3.4–10.8)

## 2019-12-21 PROCEDURE — 85025 COMPLETE CBC W/AUTO DIFF WBC: CPT | Performed by: NURSE PRACTITIONER

## 2019-12-21 PROCEDURE — 85007 BL SMEAR W/DIFF WBC COUNT: CPT | Performed by: NURSE PRACTITIONER

## 2019-12-21 RX ORDER — IBUPROFEN 600 MG/1
600 TABLET ORAL EVERY 6 HOURS PRN
Qty: 30 TABLET | Refills: 0 | Status: SHIPPED | OUTPATIENT
Start: 2019-12-21 | End: 2021-03-26

## 2019-12-21 RX ORDER — HYDROCODONE BITARTRATE AND ACETAMINOPHEN 5; 325 MG/1; MG/1
1 TABLET ORAL EVERY 4 HOURS PRN
Qty: 24 TABLET | Refills: 0 | Status: SHIPPED | OUTPATIENT
Start: 2019-12-21 | End: 2019-12-29

## 2019-12-21 RX ADMIN — PRENATAL VIT W/ FE FUMARATE-FA TAB 27-0.8 MG 1 TABLET: 27-0.8 TAB at 08:33

## 2019-12-21 RX ADMIN — HYDROCODONE BITARTRATE AND ACETAMINOPHEN 1 TABLET: 5; 325 TABLET ORAL at 03:01

## 2019-12-21 RX ADMIN — IBUPROFEN 600 MG: 600 TABLET, FILM COATED ORAL at 08:33

## 2019-12-21 RX ADMIN — BUPROPION HYDROCHLORIDE 75 MG: 75 TABLET, FILM COATED ORAL at 08:34

## 2019-12-21 RX ADMIN — FERROUS SULFATE TAB EC 324 MG (65 MG FE EQUIVALENT) 324 MG: 324 (65 FE) TABLET DELAYED RESPONSE at 08:33

## 2019-12-21 RX ADMIN — IBUPROFEN 600 MG: 600 TABLET, FILM COATED ORAL at 03:01

## 2019-12-21 RX ADMIN — DOCUSATE SODIUM 100 MG: 100 CAPSULE, LIQUID FILLED ORAL at 08:32

## 2019-12-21 RX ADMIN — LEVOTHYROXINE SODIUM 125 MCG: 125 TABLET ORAL at 05:54

## 2019-12-21 RX ADMIN — LIOTHYRONINE SODIUM 5 MCG: 5 TABLET ORAL at 08:33

## 2019-12-21 RX ADMIN — BUPROPION HYDROCHLORIDE 300 MG: 150 TABLET, EXTENDED RELEASE ORAL at 08:37

## 2019-12-21 NOTE — LACTATION NOTE
This note was copied from a baby's chart.  Mother reports feeding have improved since yesterday. Reports feeling breast changes. Nipples slightly tender, nipple care products have helped. Discussed the first night at home. Provided with  discharge weight ticket and lactation contact card. Encouraged to call as needed.

## 2019-12-21 NOTE — PLAN OF CARE
Breastfeeding infant with minimal assist from staff, pain at acceptable level with prn medications, vaginal bleeding minimal

## 2019-12-21 NOTE — DISCHARGE SUMMARY
Sumit  Delivery Discharge Summary    Primary OB Clinician: Naveed Rivera MD    Admission Diagnosis:  Active Problems:    Pregnancy      Discharge Diagnosis:  <principal problem not specified>    Gestational Age: 37w4d    Date of Delivery: 2019     Delivered By:  Naveed Rivera     Delivery Type: Vaginal, Spontaneous      Tubal Ligation: n/a    Intrapartum Course: Uncomplicated delivery.     Postpartum Course: Patient complains of pain in her left lower back that radiates into her buttocks.  She states this was noticeable after her delivery.  Otherwise she is breast-feeding well, afebrile, pain controlled.  She desires discharge home.    Physical Exam:    Vitals:   Vitals:    19 0750 19 1530 19 2240 19 0747   BP: 106/73 114/71 98/65 103/60   BP Location: Left arm Left arm Right arm    Patient Position: Sitting Lying Sitting    Pulse: 99 68 78 87   Resp: 18 16 18 16   Temp: 97.5 °F (36.4 °C) 97.7 °F (36.5 °C) 98.6 °F (37 °C) 98.5 °F (36.9 °C)   TempSrc: Oral Oral Oral    SpO2: 99% 98% 97% 98%   Weight:       Height:         Temp (24hrs), Av.3 °F (36.8 °C), Min:97.7 °F (36.5 °C), Max:98.6 °F (37 °C)      General Appearance:    Alert, cooperative, in no acute distress   Abdomen:     Soft non-tender, non-distended, no guarding, no rebound         tenderness.   Extremities:   Moves all extremities well, no edema, no cyanosis, no              Redness.   Incision: N/C   Fundus:   Firm, below umbilicus     Feeding method: Breastfeeding Status: Yes    Blood Type: RH PositiveN      Plan:  Discharge to home.    Follow-up appointment with Dr. Rivera in 6 weeks.

## 2020-01-17 ENCOUNTER — RESULTS ENCOUNTER (OUTPATIENT)
Dept: ENDOCRINOLOGY | Age: 35
End: 2020-01-17

## 2020-01-17 DIAGNOSIS — E03.8 HYPOTHYROIDISM DUE TO HASHIMOTO'S THYROIDITIS: ICD-10-CM

## 2020-01-17 DIAGNOSIS — E06.3 HYPOTHYROIDISM DUE TO HASHIMOTO'S THYROIDITIS: ICD-10-CM

## 2020-02-14 DIAGNOSIS — E03.8 HYPOTHYROIDISM DUE TO HASHIMOTO'S THYROIDITIS: Primary | ICD-10-CM

## 2020-02-14 DIAGNOSIS — E06.3 HYPOTHYROIDISM DUE TO HASHIMOTO'S THYROIDITIS: Primary | ICD-10-CM

## 2020-02-14 DIAGNOSIS — E06.3 HASHIMOTO'S THYROIDITIS: ICD-10-CM

## 2020-02-14 DIAGNOSIS — E55.9 VITAMIN D DEFICIENCY: ICD-10-CM

## 2020-02-20 DIAGNOSIS — E03.9 PRIMARY HYPOTHYROIDISM: ICD-10-CM

## 2020-02-21 ENCOUNTER — RESULTS ENCOUNTER (OUTPATIENT)
Dept: ENDOCRINOLOGY | Age: 35
End: 2020-02-21

## 2020-02-21 DIAGNOSIS — E06.3 HASHIMOTO'S THYROIDITIS: ICD-10-CM

## 2020-02-21 DIAGNOSIS — E06.3 HYPOTHYROIDISM DUE TO HASHIMOTO'S THYROIDITIS: ICD-10-CM

## 2020-02-21 DIAGNOSIS — E55.9 VITAMIN D DEFICIENCY: ICD-10-CM

## 2020-02-21 DIAGNOSIS — E03.8 HYPOTHYROIDISM DUE TO HASHIMOTO'S THYROIDITIS: ICD-10-CM

## 2020-02-21 RX ORDER — LIOTHYRONINE SODIUM 5 UG/1
5 TABLET ORAL DAILY
Qty: 90 TABLET | Refills: 0 | Status: SHIPPED | OUTPATIENT
Start: 2020-02-21 | End: 2020-05-22

## 2020-04-08 ENCOUNTER — TELEPHONE (OUTPATIENT)
Dept: ENDOCRINOLOGY | Age: 35
End: 2020-04-08

## 2020-04-08 DIAGNOSIS — E03.8 HYPOTHYROIDISM DUE TO HASHIMOTO'S THYROIDITIS: ICD-10-CM

## 2020-04-08 DIAGNOSIS — E06.3 HYPOTHYROIDISM DUE TO HASHIMOTO'S THYROIDITIS: ICD-10-CM

## 2020-04-08 RX ORDER — LEVOTHYROXINE SODIUM 125 MCG
125 TABLET ORAL DAILY
Qty: 30 TABLET | Refills: 1 | Status: SHIPPED | OUTPATIENT
Start: 2020-04-08 | End: 2020-06-04

## 2020-04-08 NOTE — TELEPHONE ENCOUNTER
Pt called said she needs a refill on medication synthroid she said it was under kris's name but its carolyn to refill it today she is out.

## 2020-05-22 DIAGNOSIS — E03.9 PRIMARY HYPOTHYROIDISM: ICD-10-CM

## 2020-05-22 RX ORDER — LIOTHYRONINE SODIUM 5 UG/1
TABLET ORAL
Qty: 90 TABLET | Refills: 0 | Status: SHIPPED | OUTPATIENT
Start: 2020-05-22 | End: 2020-08-13 | Stop reason: SDUPTHER

## 2020-06-04 DIAGNOSIS — E06.3 HYPOTHYROIDISM DUE TO HASHIMOTO'S THYROIDITIS: ICD-10-CM

## 2020-06-04 DIAGNOSIS — E03.8 HYPOTHYROIDISM DUE TO HASHIMOTO'S THYROIDITIS: ICD-10-CM

## 2020-06-04 RX ORDER — LEVOTHYROXINE SODIUM 125 MCG
125 TABLET ORAL DAILY
Qty: 30 TABLET | Refills: 1 | Status: SHIPPED | OUTPATIENT
Start: 2020-06-04 | End: 2020-08-07

## 2020-07-06 DIAGNOSIS — E55.9 VITAMIN D DEFICIENCY: ICD-10-CM

## 2020-07-06 RX ORDER — ERGOCALCIFEROL 1.25 MG/1
CAPSULE ORAL
Qty: 26 CAPSULE | Refills: 3 | Status: SHIPPED | OUTPATIENT
Start: 2020-07-06 | End: 2020-08-13 | Stop reason: SDUPTHER

## 2020-07-27 ENCOUNTER — TELEPHONE (OUTPATIENT)
Dept: ENDOCRINOLOGY | Age: 35
End: 2020-07-27

## 2020-07-27 DIAGNOSIS — E03.8 HYPOTHYROIDISM DUE TO HASHIMOTO'S THYROIDITIS: ICD-10-CM

## 2020-07-27 DIAGNOSIS — R53.82 CHRONIC FATIGUE: ICD-10-CM

## 2020-07-27 DIAGNOSIS — E06.3 HASHIMOTO'S THYROIDITIS: ICD-10-CM

## 2020-07-27 DIAGNOSIS — E55.9 VITAMIN D DEFICIENCY: Primary | ICD-10-CM

## 2020-07-27 DIAGNOSIS — E06.3 HYPOTHYROIDISM DUE TO HASHIMOTO'S THYROIDITIS: ICD-10-CM

## 2020-07-27 NOTE — TELEPHONE ENCOUNTER
This pt called requesting lab orders be faxed to [facility]  Fax to   [phone]  This is for her 08/13/20 appt. With Dr. Brown.  Stated he plans are to go Thursday to have them done.

## 2020-07-28 DIAGNOSIS — E03.8 HYPOTHYROIDISM DUE TO HASHIMOTO'S THYROIDITIS: ICD-10-CM

## 2020-07-28 DIAGNOSIS — R53.82 CHRONIC FATIGUE: ICD-10-CM

## 2020-07-28 DIAGNOSIS — E06.3 HASHIMOTO'S THYROIDITIS: ICD-10-CM

## 2020-07-28 DIAGNOSIS — E06.3 HYPOTHYROIDISM DUE TO HASHIMOTO'S THYROIDITIS: ICD-10-CM

## 2020-07-28 DIAGNOSIS — E55.9 VITAMIN D DEFICIENCY: ICD-10-CM

## 2020-07-30 ENCOUNTER — LAB (OUTPATIENT)
Dept: LAB | Facility: HOSPITAL | Age: 35
End: 2020-07-30

## 2020-07-30 DIAGNOSIS — E06.5 HYPOTHYROIDISM DUE TO FIBROUS INVASIVE THYROIDITIS: ICD-10-CM

## 2020-07-30 DIAGNOSIS — E55.9 VITAMIN D DEFICIENCY: Primary | ICD-10-CM

## 2020-07-30 DIAGNOSIS — E06.3 CHRONIC LYMPHOCYTIC THYROIDITIS: ICD-10-CM

## 2020-07-30 DIAGNOSIS — G93.32 CHRONIC FATIGUE SYNDROME: ICD-10-CM

## 2020-07-30 DIAGNOSIS — E03.8 HYPOTHYROIDISM DUE TO FIBROUS INVASIVE THYROIDITIS: ICD-10-CM

## 2020-07-30 DIAGNOSIS — E06.3 HASHIMOTO'S DISEASE: ICD-10-CM

## 2020-07-30 LAB
25(OH)D3 SERPL-MCNC: 54.2 NG/ML (ref 30–100)
ALBUMIN SERPL-MCNC: 4.3 G/DL (ref 3.5–5.2)
ALBUMIN/GLOB SERPL: 1.4 G/DL
ALP SERPL-CCNC: 79 U/L (ref 39–117)
ALT SERPL W P-5'-P-CCNC: 9 U/L (ref 1–33)
ANION GAP SERPL CALCULATED.3IONS-SCNC: 7.6 MMOL/L (ref 5–15)
AST SERPL-CCNC: 9 U/L (ref 1–32)
BILIRUB SERPL-MCNC: 0.2 MG/DL (ref 0–1.2)
BUN SERPL-MCNC: 15 MG/DL (ref 6–20)
BUN/CREAT SERPL: 16.1 (ref 7–25)
CALCIUM SPEC-SCNC: 9.6 MG/DL (ref 8.6–10.5)
CHLORIDE SERPL-SCNC: 105 MMOL/L (ref 98–107)
CHOLEST SERPL-MCNC: 149 MG/DL (ref 0–200)
CO2 SERPL-SCNC: 25.4 MMOL/L (ref 22–29)
CREAT SERPL-MCNC: 0.93 MG/DL (ref 0.57–1)
GFR SERPL CREATININE-BSD FRML MDRD: 69 ML/MIN/1.73
GLOBULIN UR ELPH-MCNC: 3 GM/DL
GLUCOSE SERPL-MCNC: 97 MG/DL (ref 65–99)
HDLC SERPL-MCNC: 35 MG/DL (ref 40–60)
LDLC SERPL CALC-MCNC: 93 MG/DL (ref 0–100)
LDLC/HDLC SERPL: 2.66 {RATIO}
POTASSIUM SERPL-SCNC: 4.6 MMOL/L (ref 3.5–5.2)
PROT SERPL-MCNC: 7.3 G/DL (ref 6–8.5)
SODIUM SERPL-SCNC: 138 MMOL/L (ref 136–145)
T3FREE SERPL-MCNC: 3.39 PG/ML (ref 2–4.4)
T4 FREE SERPL-MCNC: 1.49 NG/DL (ref 0.93–1.7)
TRIGL SERPL-MCNC: 104 MG/DL (ref 0–150)
TSH SERPL DL<=0.05 MIU/L-ACNC: 2.21 UIU/ML (ref 0.27–4.2)
URATE SERPL-MCNC: 5.9 MG/DL (ref 2.4–5.7)
VLDLC SERPL-MCNC: 20.8 MG/DL (ref 5–40)

## 2020-07-30 PROCEDURE — 84481 FREE ASSAY (FT-3): CPT

## 2020-07-30 PROCEDURE — 84550 ASSAY OF BLOOD/URIC ACID: CPT

## 2020-07-30 PROCEDURE — 36415 COLL VENOUS BLD VENIPUNCTURE: CPT

## 2020-07-30 PROCEDURE — 80061 LIPID PANEL: CPT

## 2020-07-30 PROCEDURE — 84443 ASSAY THYROID STIM HORMONE: CPT

## 2020-07-30 PROCEDURE — 84439 ASSAY OF FREE THYROXINE: CPT

## 2020-07-30 PROCEDURE — 82306 VITAMIN D 25 HYDROXY: CPT

## 2020-07-30 PROCEDURE — 84432 ASSAY OF THYROGLOBULIN: CPT

## 2020-07-30 PROCEDURE — 80053 COMPREHEN METABOLIC PANEL: CPT

## 2020-08-06 LAB — THYROGLOBULIN (TG-RIA): <2 NG/ML

## 2020-08-07 DIAGNOSIS — E03.8 HYPOTHYROIDISM DUE TO HASHIMOTO'S THYROIDITIS: ICD-10-CM

## 2020-08-07 DIAGNOSIS — E06.3 HYPOTHYROIDISM DUE TO HASHIMOTO'S THYROIDITIS: ICD-10-CM

## 2020-08-07 RX ORDER — LEVOTHYROXINE SODIUM 125 MCG
125 TABLET ORAL DAILY
Qty: 30 TABLET | Refills: 1 | Status: SHIPPED | OUTPATIENT
Start: 2020-08-07 | End: 2020-08-13 | Stop reason: SDUPTHER

## 2020-08-13 ENCOUNTER — OFFICE VISIT (OUTPATIENT)
Dept: ENDOCRINOLOGY | Age: 35
End: 2020-08-13

## 2020-08-13 VITALS
HEIGHT: 65 IN | BODY MASS INDEX: 40.15 KG/M2 | WEIGHT: 241 LBS | DIASTOLIC BLOOD PRESSURE: 72 MMHG | SYSTOLIC BLOOD PRESSURE: 118 MMHG | RESPIRATION RATE: 16 BRPM

## 2020-08-13 DIAGNOSIS — E55.9 VITAMIN D DEFICIENCY: ICD-10-CM

## 2020-08-13 DIAGNOSIS — E03.9 PRIMARY HYPOTHYROIDISM: ICD-10-CM

## 2020-08-13 DIAGNOSIS — E06.3 HASHIMOTO'S THYROIDITIS: ICD-10-CM

## 2020-08-13 DIAGNOSIS — E03.8 HYPOTHYROIDISM DUE TO HASHIMOTO'S THYROIDITIS: Primary | ICD-10-CM

## 2020-08-13 DIAGNOSIS — Z83.3 FAMILY HISTORY OF DIABETES MELLITUS (DM): ICD-10-CM

## 2020-08-13 DIAGNOSIS — E06.3 HYPOTHYROIDISM DUE TO HASHIMOTO'S THYROIDITIS: Primary | ICD-10-CM

## 2020-08-13 DIAGNOSIS — R53.82 CHRONIC FATIGUE: ICD-10-CM

## 2020-08-13 PROCEDURE — 99214 OFFICE O/P EST MOD 30 MIN: CPT | Performed by: INTERNAL MEDICINE

## 2020-08-13 RX ORDER — ERGOCALCIFEROL 1.25 MG/1
50000 CAPSULE ORAL 2 TIMES WEEKLY
Qty: 26 CAPSULE | Refills: 3 | Status: SHIPPED | OUTPATIENT
Start: 2020-08-13 | End: 2021-03-26 | Stop reason: SDUPTHER

## 2020-08-13 RX ORDER — LIOTHYRONINE SODIUM 5 UG/1
5 TABLET ORAL DAILY
Qty: 90 TABLET | Refills: 3 | Status: SHIPPED | OUTPATIENT
Start: 2020-08-13 | End: 2021-03-26 | Stop reason: SDUPTHER

## 2020-08-13 RX ORDER — LEVOTHYROXINE SODIUM 125 MCG
125 TABLET ORAL DAILY
Qty: 90 TABLET | Refills: 3 | Status: SHIPPED | OUTPATIENT
Start: 2020-08-13 | End: 2021-03-26 | Stop reason: SDUPTHER

## 2020-08-13 NOTE — PROGRESS NOTES
"Subjective   Vivian Martínez is a 35 y.o. female seen for follow up for Hashimoto's Thyroiditis; Hypothyroidism; and lab review  She denies any problems or concerns.       History of Present Illness this is a 35-year-old female with Hashimoto thyroiditis and hypothyroidism as well as chronic fatigue and vitamin D deficiency.  Over the past 6 months she has had no significant health problem for which to go to the emergency room or hospital.  8 months ago she delivered a healthy baby boy and she is no longer nursing.  She is not planning on becoming pregnant anytime soon.    The following portions of the patient's history were reviewed and updated as appropriate: allergies, current medications, past family history, past medical history, past social history, past surgical history and problem list.      /72   Resp 16   Ht 165.1 cm (65\")   Wt 109 kg (241 lb)   BMI 40.10 kg/m²      No Known Allergies       Current Outpatient Medications:   •  buPROPion (WELLBUTRIN) 75 MG tablet, Take 75 mg by mouth Daily., Disp: , Rfl:   •  buPROPion XL (WELLBUTRIN XL) 300 MG 24 hr tablet, TAKE 1 TABLET BY MOUTH EVERY DAY, Disp: , Rfl: 2  •  ibuprofen (ADVIL,MOTRIN) 600 MG tablet, Take 1 tablet by mouth Every 6 (Six) Hours As Needed for Mild Pain ., Disp: 30 tablet, Rfl: 0  •  liothyronine (CYTOMEL) 5 MCG tablet, TAKE 1 TABLET BY MOUTH EVERY DAY, Disp: 90 tablet, Rfl: 0  •  SYNTHROID 125 MCG tablet, TAKE 1 TABLET BY MOUTH DAILY, Disp: 30 tablet, Rfl: 1  •  vitamin D (ERGOCALCIFEROL) 1.25 MG (01116 UT) capsule capsule, TAKE 1 CAPSULE BY MOUTH 2 TIMES A WEEK, Disp: 26 capsule, Rfl: 3     Review of Systems   Constitutional: Negative.    HENT: Negative.    Eyes: Negative.    Respiratory: Negative.    Cardiovascular: Negative.    Gastrointestinal: Negative.    Endocrine: Negative.    Genitourinary: Negative.    Musculoskeletal: Negative.    Skin: Negative.    Allergic/Immunologic: Negative.    Neurological: Negative.    Hematological: " Negative.    Psychiatric/Behavioral: Negative.    The above review of system was reviewed, corroborated and accepted.  Objective   Physical Exam   Constitutional: She is oriented to person, place, and time. She appears well-developed and well-nourished. No distress.   HENT:   Head: Normocephalic and atraumatic.   Right Ear: External ear normal.   Left Ear: External ear normal.   Nose: Nose normal.   Mouth/Throat: Oropharynx is clear and moist. No oropharyngeal exudate.   Eyes: Pupils are equal, round, and reactive to light. Conjunctivae and EOM are normal. Right eye exhibits no discharge. Left eye exhibits no discharge. No scleral icterus.   Neck: Normal range of motion. Neck supple. No JVD present. No tracheal deviation present. No thyromegaly present.   Cardiovascular: Normal rate, regular rhythm, normal heart sounds and intact distal pulses. Exam reveals no gallop and no friction rub.   No murmur heard.  Pulmonary/Chest: Effort normal and breath sounds normal. No stridor. No respiratory distress. She has no wheezes. She has no rales. She exhibits no tenderness.   Abdominal: Soft. Bowel sounds are normal. She exhibits no distension and no mass. There is no tenderness. There is no rebound and no guarding. No hernia.   Musculoskeletal: Normal range of motion. She exhibits no edema, tenderness or deformity.   Lymphadenopathy:     She has no cervical adenopathy.   Neurological: She is alert and oriented to person, place, and time. She has normal reflexes. She displays normal reflexes. No cranial nerve deficit or sensory deficit. She exhibits normal muscle tone. Coordination normal.   Skin: Skin is warm and dry. No rash noted. She is not diaphoretic. No erythema. No pallor.   Psychiatric: She has a normal mood and affect. Her behavior is normal. Judgment and thought content normal.   Nursing note and vitals reviewed.  No significant change since 7/9/2019 office visit.    Results for orders placed or performed in visit  on 07/30/20   Vitamin D 25 Hydroxy   Result Value Ref Range    25 Hydroxy, Vitamin D 54.2 30.0 - 100.0 ng/ml   Uric Acid   Result Value Ref Range    Uric Acid 5.9 (H) 2.4 - 5.7 mg/dL   T4, Free   Result Value Ref Range    Free T4 1.49 0.93 - 1.70 ng/dL   T3, Free   Result Value Ref Range    T3, Free 3.39 2.00 - 4.40 pg/mL   TSH   Result Value Ref Range    TSH 2.210 0.270 - 4.200 uIU/mL   Lipid Panel   Result Value Ref Range    Total Cholesterol 149 0 - 200 mg/dL    Triglycerides 104 0 - 150 mg/dL    HDL Cholesterol 35 (L) 40 - 60 mg/dL    LDL Cholesterol  93 0 - 100 mg/dL    VLDL Cholesterol 20.8 5 - 40 mg/dL    LDL/HDL Ratio 2.66    Thyroglobulin   Result Value Ref Range    Thyroglobulin (TG-MARTIN) <2.0 ng/mL   Comprehensive Metabolic Panel   Result Value Ref Range    Glucose 97 65 - 99 mg/dL    BUN 15 6 - 20 mg/dL    Creatinine 0.93 0.57 - 1.00 mg/dL    Sodium 138 136 - 145 mmol/L    Potassium 4.6 3.5 - 5.2 mmol/L    Chloride 105 98 - 107 mmol/L    CO2 25.4 22.0 - 29.0 mmol/L    Calcium 9.6 8.6 - 10.5 mg/dL    Total Protein 7.3 6.0 - 8.5 g/dL    Albumin 4.30 3.50 - 5.20 g/dL    ALT (SGPT) 9 1 - 33 U/L    AST (SGOT) 9 1 - 32 U/L    Alkaline Phosphatase 79 39 - 117 U/L    Total Bilirubin 0.2 0.0 - 1.2 mg/dL    eGFR Non African Amer 69 >60 mL/min/1.73    Globulin 3.0 gm/dL    A/G Ratio 1.4 g/dL    BUN/Creatinine Ratio 16.1 7.0 - 25.0    Anion Gap 7.6 5.0 - 15.0 mmol/L        Assessment/Plan   Vivian was seen today for hashimoto's thyroiditis, hypothyroidism and lab review.    Diagnoses and all orders for this visit:    Hypothyroidism due to Hashimoto's thyroiditis  -     T4 & TSH ([facility]); Future  -     T3, Free; Future  -     T4, Free; Future  -     Thyroglobulin With Anti-TG; Future  -     Uric Acid; Future  -     Vitamin D 25 Hydroxy; Future  -     Comprehensive Metabolic Panel; Future  -     C-Peptide; Future  -     Hemoglobin A1c; Future  -     MicroAlbumin, Urine, Random - Urine, Clean Catch; Future  -     NMR  LipoProfile; Future  -     SYNTHROID 125 MCG tablet; Take 1 tablet by mouth Daily.    Hashimoto's thyroiditis  -     T4 & TSH ([facility]); Future  -     T3, Free; Future  -     T4, Free; Future  -     Thyroglobulin With Anti-TG; Future  -     Uric Acid; Future  -     Vitamin D 25 Hydroxy; Future  -     Comprehensive Metabolic Panel; Future  -     C-Peptide; Future  -     Hemoglobin A1c; Future  -     MicroAlbumin, Urine, Random - Urine, Clean Catch; Future  -     NMR LipoProfile; Future    Vitamin D deficiency  -     T4 & TSH ([facility]); Future  -     T3, Free; Future  -     T4, Free; Future  -     Thyroglobulin With Anti-TG; Future  -     Uric Acid; Future  -     Vitamin D 25 Hydroxy; Future  -     Comprehensive Metabolic Panel; Future  -     C-Peptide; Future  -     Hemoglobin A1c; Future  -     MicroAlbumin, Urine, Random - Urine, Clean Catch; Future  -     NMR LipoProfile; Future  -     vitamin D (ERGOCALCIFEROL) 1.25 MG (34632 UT) capsule capsule; Take 1 capsule by mouth 2 (Two) Times a Week.    Family history of diabetes mellitus (DM)  -     T4 & TSH ([facility]); Future  -     T3, Free; Future  -     T4, Free; Future  -     Thyroglobulin With Anti-TG; Future  -     Uric Acid; Future  -     Vitamin D 25 Hydroxy; Future  -     Comprehensive Metabolic Panel; Future  -     C-Peptide; Future  -     Hemoglobin A1c; Future  -     MicroAlbumin, Urine, Random - Urine, Clean Catch; Future  -     NMR LipoProfile; Future    Chronic fatigue  -     T4 & TSH ([facility]); Future  -     T3, Free; Future  -     T4, Free; Future  -     Thyroglobulin With Anti-TG; Future  -     Uric Acid; Future  -     Vitamin D 25 Hydroxy; Future  -     Comprehensive Metabolic Panel; Future  -     C-Peptide; Future  -     Hemoglobin A1c; Future  -     MicroAlbumin, Urine, Random - Urine, Clean Catch; Future  -     NMR LipoProfile; Future    Primary hypothyroidism  -     liothyronine (CYTOMEL) 5 MCG tablet; Take 1 tablet by mouth Daily.      In summary  I saw and examined this 35-year-old female for above-mentioned problems.  I reviewed the laboratory evaluations of 7/30/2020 and provided her with a hard copy of it.  Overall she is clinically and metabolically stable we will go ahead and continue her current prescriptions.  I will see her in 6 months or sooner if needed with laboratory evaluation prior to each office visit.

## 2021-03-18 ENCOUNTER — TELEPHONE (OUTPATIENT)
Dept: ENDOCRINOLOGY | Age: 36
End: 2021-03-18

## 2021-03-18 NOTE — TELEPHONE ENCOUNTER
Needs labs sent to [facility] fax # [phone] having labs done there  For upcoming appt please and thank you

## 2021-03-19 ENCOUNTER — LAB (OUTPATIENT)
Dept: LAB | Facility: HOSPITAL | Age: 36
End: 2021-03-19

## 2021-03-19 DIAGNOSIS — E06.3 HYPOTHYROIDISM DUE TO HASHIMOTO'S THYROIDITIS: Primary | ICD-10-CM

## 2021-03-19 DIAGNOSIS — G93.32 CHRONIC FATIGUE SYNDROME: ICD-10-CM

## 2021-03-19 DIAGNOSIS — E06.3 CHRONIC LYMPHOCYTIC THYROIDITIS: ICD-10-CM

## 2021-03-19 DIAGNOSIS — E03.8 HYPOTHYROIDISM DUE TO HASHIMOTO'S THYROIDITIS: Primary | ICD-10-CM

## 2021-03-19 DIAGNOSIS — E55.9 VITAMIN D DEFICIENCY: ICD-10-CM

## 2021-03-19 DIAGNOSIS — R68.89 ABNORMAL ENDOCRINE LABORATORY TEST FINDING: ICD-10-CM

## 2021-03-19 DIAGNOSIS — E55.9 VITAMIN D DEFICIENCY: Primary | ICD-10-CM

## 2021-03-19 DIAGNOSIS — E06.5 HYPOTHYROIDISM DUE TO FIBROUS INVASIVE THYROIDITIS: ICD-10-CM

## 2021-03-19 DIAGNOSIS — E03.8 HYPOTHYROIDISM DUE TO FIBROUS INVASIVE THYROIDITIS: ICD-10-CM

## 2021-03-19 DIAGNOSIS — R53.82 CHRONIC FATIGUE: ICD-10-CM

## 2021-03-19 LAB
25(OH)D3 SERPL-MCNC: 65.2 NG/ML (ref 30–100)
ALBUMIN SERPL-MCNC: 4.4 G/DL (ref 3.5–5.2)
ALBUMIN/GLOB SERPL: 1.5 G/DL
ALP SERPL-CCNC: 93 U/L (ref 39–117)
ALT SERPL W P-5'-P-CCNC: 10 U/L (ref 1–33)
ANION GAP SERPL CALCULATED.3IONS-SCNC: 8.3 MMOL/L (ref 5–15)
AST SERPL-CCNC: 22 U/L (ref 1–32)
BILIRUB SERPL-MCNC: <0.2 MG/DL (ref 0–1.2)
BUN SERPL-MCNC: 16 MG/DL (ref 6–20)
BUN/CREAT SERPL: 16.5 (ref 7–25)
CALCIUM SPEC-SCNC: 9.4 MG/DL (ref 8.6–10.5)
CHLORIDE SERPL-SCNC: 107 MMOL/L (ref 98–107)
CHOLEST SERPL-MCNC: 147 MG/DL (ref 0–200)
CO2 SERPL-SCNC: 25.7 MMOL/L (ref 22–29)
CREAT SERPL-MCNC: 0.97 MG/DL (ref 0.57–1)
GFR SERPL CREATININE-BSD FRML MDRD: 65 ML/MIN/1.73
GLOBULIN UR ELPH-MCNC: 3 GM/DL
GLUCOSE SERPL-MCNC: 88 MG/DL (ref 65–99)
HBA1C MFR BLD: 5.3 % (ref 3.5–5.6)
HDLC SERPL-MCNC: 34 MG/DL (ref 40–60)
LDLC SERPL CALC-MCNC: 98 MG/DL (ref 0–100)
LDLC/HDLC SERPL: 2.88 {RATIO}
POTASSIUM SERPL-SCNC: 4.8 MMOL/L (ref 3.5–5.2)
PROT SERPL-MCNC: 7.4 G/DL (ref 6–8.5)
SODIUM SERPL-SCNC: 141 MMOL/L (ref 136–145)
T3FREE SERPL-MCNC: 2.91 PG/ML (ref 2–4.4)
T4 FREE SERPL-MCNC: 1.37 NG/DL (ref 0.93–1.7)
TRIGL SERPL-MCNC: 76 MG/DL (ref 0–150)
TSH SERPL DL<=0.05 MIU/L-ACNC: 1.45 UIU/ML (ref 0.27–4.2)
VLDLC SERPL-MCNC: 15 MG/DL (ref 5–40)

## 2021-03-19 PROCEDURE — 84481 FREE ASSAY (FT-3): CPT

## 2021-03-19 PROCEDURE — 82306 VITAMIN D 25 HYDROXY: CPT

## 2021-03-19 PROCEDURE — 84443 ASSAY THYROID STIM HORMONE: CPT

## 2021-03-19 PROCEDURE — 84439 ASSAY OF FREE THYROXINE: CPT

## 2021-03-19 PROCEDURE — 36415 COLL VENOUS BLD VENIPUNCTURE: CPT

## 2021-03-19 PROCEDURE — 80053 COMPREHEN METABOLIC PANEL: CPT

## 2021-03-19 PROCEDURE — 80061 LIPID PANEL: CPT

## 2021-03-19 PROCEDURE — 83036 HEMOGLOBIN GLYCOSYLATED A1C: CPT

## 2021-03-26 ENCOUNTER — OFFICE VISIT (OUTPATIENT)
Dept: ENDOCRINOLOGY | Age: 36
End: 2021-03-26

## 2021-03-26 VITALS
DIASTOLIC BLOOD PRESSURE: 74 MMHG | WEIGHT: 235 LBS | SYSTOLIC BLOOD PRESSURE: 120 MMHG | BODY MASS INDEX: 40.12 KG/M2 | HEIGHT: 64 IN

## 2021-03-26 DIAGNOSIS — E06.3 HYPOTHYROIDISM DUE TO HASHIMOTO'S THYROIDITIS: ICD-10-CM

## 2021-03-26 DIAGNOSIS — E03.8 HYPOTHYROIDISM DUE TO HASHIMOTO'S THYROIDITIS: ICD-10-CM

## 2021-03-26 DIAGNOSIS — E55.9 VITAMIN D DEFICIENCY: ICD-10-CM

## 2021-03-26 DIAGNOSIS — E03.9 PRIMARY HYPOTHYROIDISM: Primary | ICD-10-CM

## 2021-03-26 PROCEDURE — 99213 OFFICE O/P EST LOW 20 MIN: CPT | Performed by: NURSE PRACTITIONER

## 2021-03-26 RX ORDER — LEVOTHYROXINE SODIUM 125 MCG
125 TABLET ORAL DAILY
Qty: 90 TABLET | Refills: 1 | Status: SHIPPED | OUTPATIENT
Start: 2021-03-26 | End: 2022-04-14 | Stop reason: SDUPTHER

## 2021-03-26 RX ORDER — LIOTHYRONINE SODIUM 5 UG/1
5 TABLET ORAL DAILY
Qty: 90 TABLET | Refills: 1 | Status: SHIPPED | OUTPATIENT
Start: 2021-03-26 | End: 2022-03-28

## 2021-03-26 RX ORDER — ERGOCALCIFEROL 1.25 MG/1
50000 CAPSULE ORAL 2 TIMES WEEKLY
Qty: 26 CAPSULE | Refills: 1 | Status: SHIPPED | OUTPATIENT
Start: 2021-03-29 | End: 2021-12-17

## 2021-03-26 RX ORDER — ACETAMINOPHEN AND CODEINE PHOSPHATE 120; 12 MG/5ML; MG/5ML
1 SOLUTION ORAL DAILY
COMMUNITY
Start: 2021-02-15

## 2021-03-26 NOTE — PATIENT INSTRUCTIONS
Olive oil. The type of heart-healthy fat found in olives and olive oil can lower the inflammatory impact of LDL cholesterol on your body. ...  Beans and legumes. ...  Whole grains. ...  High-fiber fruit. ...  Fatty fish. ...  Flax. ...  Nuts. ...  Erik seeds.

## 2021-03-26 NOTE — PROGRESS NOTES
"Chief Complaint  Hypothyroidism (hair is falling out more, gave birth 15 months ago)    Subjective          Vivian Martínez presents to [facility] ENDOCRINOLOGY  History of Present Illness  Hypothyroid diagnosed around 2008  + Hashimotos  Denies Dysphagia or voice changes  Denies Chest pain or palpitations  Fatigue is stable   Weight changes are stable   Reports Hair loss but denies dry skin  Denies Diarrhea or constipation  Synthroid 125 mcg daily and Cytomel 5 mcg daily  Reports compliance  Lab Results   Component Value Date    TSH 1.450 03/19/2021   needs to increase exercise      Vitamin d   Level 65.2  50,000u 2x weekly     Objective   Vital Signs:   /74   Ht 162.6 cm (64\")   Wt 107 kg (235 lb)   BMI 40.34 kg/m²     Physical Exam  Vitals reviewed.   Constitutional:       General: She is not in acute distress.  HENT:      Head: Normocephalic and atraumatic.   Cardiovascular:      Rate and Rhythm: Normal rate and regular rhythm.   Pulmonary:      Effort: Pulmonary effort is normal. No respiratory distress.   Musculoskeletal:         General: No signs of injury. Normal range of motion.      Cervical back: Normal range of motion and neck supple.   Skin:     General: Skin is warm and dry.   Neurological:      Mental Status: She is alert and oriented to person, place, and time. Mental status is at baseline.   Psychiatric:         Mood and Affect: Mood normal.         Behavior: Behavior normal.         Thought Content: Thought content normal.         Judgment: Judgment normal.        Result Review :   The following data was reviewed by: JOSESITO Aguirre on 03/26/2021:  Common labs    Common Labsle 7/30/20 7/30/20 7/30/20 3/19/21 3/19/21 3/19/21    1043 1043 1043 1001 1001 1001   Glucose   97  88    BUN   15  16    Creatinine   0.93  0.97    eGFR Non African Am   69  65    Sodium   138  141    Potassium   4.6  4.8    Chloride   105  107    Calcium   9.6  9.4    Albumin   4.30  4.40    Total " Bilirubin   0.2  <0.2    Alkaline Phosphatase   79  93    AST (SGOT)   9  22    ALT (SGPT)   9  10    Total Cholesterol  149    147   Triglycerides  104    76   HDL Cholesterol  35 (A)    34 (A)   LDL Cholesterol   93    98   Hemoglobin A1C    5.3     Uric Acid 5.9 (A)        (A) Abnormal value                      Assessment and Plan    Diagnoses and all orders for this visit:    1. Primary hypothyroidism (Primary)  -     liothyronine (CYTOMEL) 5 MCG tablet; Take 1 tablet by mouth Daily.  Dispense: 90 tablet; Refill: 1  -     TSH; Future  -     T4, Free; Future  -     T3, Free; Future  -     Comprehensive Metabolic Panel; Future  -     Vitamin D 25 Hydroxy; Future    2. Hypothyroidism due to Hashimoto's thyroiditis  -     Synthroid 125 MCG tablet; Take 1 tablet by mouth Daily.  Dispense: 90 tablet; Refill: 1    3. Vitamin D deficiency  -     vitamin D (ERGOCALCIFEROL) 1.25 MG (30003 UT) capsule capsule; Take 1 capsule by mouth 2 (Two) Times a Week.  Dispense: 26 capsule; Refill: 1        Follow Up   Return in about 6 months (around 9/26/2021).   Hair loss may be a result of postpartum hormone changes, recommended a multivitamin.  Discussed with patient to stop this medication 2 weeks prior to her labs if it contains biotin  Continue Synthroid 125 mcg along with Cytomel 5 mcg daily   Needs close monitoring to reduce risk of complications from over or under treatment with thyroid hormone replacement  Continue vitamin D 50,000 twice weekly    Patient was given instructions and counseling regarding her condition or for health maintenance advice. Please see specific information pulled into the AVS if appropriate.     JOSESITO Aguirre

## 2021-12-06 ENCOUNTER — LAB (OUTPATIENT)
Dept: LAB | Facility: HOSPITAL | Age: 36
End: 2021-12-06

## 2021-12-06 PROCEDURE — 84439 ASSAY OF FREE THYROXINE: CPT | Performed by: NURSE PRACTITIONER

## 2021-12-06 PROCEDURE — 84443 ASSAY THYROID STIM HORMONE: CPT | Performed by: NURSE PRACTITIONER

## 2021-12-06 PROCEDURE — 80053 COMPREHEN METABOLIC PANEL: CPT | Performed by: NURSE PRACTITIONER

## 2021-12-06 PROCEDURE — 82306 VITAMIN D 25 HYDROXY: CPT | Performed by: NURSE PRACTITIONER

## 2021-12-06 PROCEDURE — 84481 FREE ASSAY (FT-3): CPT | Performed by: NURSE PRACTITIONER

## 2021-12-13 ENCOUNTER — OFFICE VISIT (OUTPATIENT)
Dept: ENDOCRINOLOGY | Age: 36
End: 2021-12-13

## 2021-12-13 VITALS
DIASTOLIC BLOOD PRESSURE: 78 MMHG | HEIGHT: 64 IN | BODY MASS INDEX: 41.32 KG/M2 | WEIGHT: 242 LBS | OXYGEN SATURATION: 98 % | HEART RATE: 100 BPM | SYSTOLIC BLOOD PRESSURE: 124 MMHG

## 2021-12-13 DIAGNOSIS — E55.9 VITAMIN D DEFICIENCY: ICD-10-CM

## 2021-12-13 DIAGNOSIS — E03.9 PRIMARY HYPOTHYROIDISM: Primary | ICD-10-CM

## 2021-12-13 DIAGNOSIS — R53.82 CHRONIC FATIGUE: ICD-10-CM

## 2021-12-13 PROCEDURE — 99214 OFFICE O/P EST MOD 30 MIN: CPT | Performed by: INTERNAL MEDICINE

## 2021-12-13 NOTE — PROGRESS NOTES
"Chief Complaint  Chief Complaint   Patient presents with   • Hypothyroidism   FOLLOW UP/ HYPOTHYROIDISM    Subjective          History of Present Illness    Vivian Martínez 36 y.o. presents as a follow-up for evaluation of hypothyroidism and vitamin D deficiency    Patient used to see Dr. Brown in the past, transferred the care to me today.    Today in clinic patient reports that she is on Synthroid 125 mcg oral daily and Cytomel 5 mcg oral daily.  She is compliant with the medication and takes it on empty stomach.  Her dose has been more or less stable over the last 2 years.  Reports that adjustment of her thyroid dosage during pregnancy.    Energy levels are good, weight has been stable.  Improvement in her hair loss has been reported in today's visit.  Not planning to have another kid at this time.  No other significant hyper or hypothyroid symptoms.    Reviewed primary care physician's/consulting physician documentation and lab results         I have reviewed the patient's allergies, medicines, past medical hx, family hx and social hx in detail.    Objective   Vital Signs:   /78   Pulse 100   Ht 162.6 cm (64\")   Wt 110 kg (242 lb)   SpO2 98%   BMI 41.54 kg/m²   Physical Exam   General appearance - no distress  Eyes- anicteric sclera  Ear nose and throat-external ears and nose normal.    Respiratory-normal chest on inspection.  No respiratory distress noted.  Skin-no rashes.  Neuro-alert and oriented x3          Result Review :   The following data was reviewed by: Abdoul Albert MD on 12/13/2021:  Results Encounter on 09/26/2021   Component Date Value Ref Range Status   • TSH 12/06/2021 1.670  0.270 - 4.200 uIU/mL Final   • Free T4 12/06/2021 1.40  0.93 - 1.70 ng/dL Final   • T3, Free 12/06/2021 2.72  2.00 - 4.40 pg/mL Final   • Glucose 12/06/2021 98  65 - 99 mg/dL Final   • BUN 12/06/2021 13  6 - 20 mg/dL Final   • Creatinine 12/06/2021 0.88  0.57 - 1.00 mg/dL Final   • Sodium 12/06/2021 138  136 - " 145 mmol/L Final   • Potassium 12/06/2021 4.2  3.5 - 5.2 mmol/L Final   • Chloride 12/06/2021 100  98 - 107 mmol/L Final   • CO2 12/06/2021 20.2* 22.0 - 29.0 mmol/L Final   • Calcium 12/06/2021 9.8  8.6 - 10.5 mg/dL Final   • Total Protein 12/06/2021 7.9  6.0 - 8.5 g/dL Final   • Albumin 12/06/2021 4.70  3.50 - 5.20 g/dL Final   • ALT (SGPT) 12/06/2021 8  1 - 33 U/L Final   • AST (SGOT) 12/06/2021 11  1 - 32 U/L Final   • Alkaline Phosphatase 12/06/2021 102  39 - 117 U/L Final   • Total Bilirubin 12/06/2021 <0.2  0.0 - 1.2 mg/dL Final   • eGFR Non  Amer 12/06/2021 73  >60 mL/min/1.73 Final   • Globulin 12/06/2021 3.2  gm/dL Final   • A/G Ratio 12/06/2021 1.5  g/dL Final   • BUN/Creatinine Ratio 12/06/2021 14.8  7.0 - 25.0 Final   • Anion Gap 12/06/2021 17.8* 5.0 - 15.0 mmol/L Final   • 25 Hydroxy, Vitamin D 12/06/2021 54.8  30.0 - 100.0 ng/ml Final     Data reviewed: Dr. Brown documentation       Results Review:    I reviewed the patient's new clinical results.     Assessment and Plan    Problem List Items Addressed This Visit        Other    Vitamin D deficiency    Relevant Orders    TSH    T4, Free    T3, Free    Comprehensive Metabolic Panel    Hemoglobin A1c    Lipid Panel    Vitamin D 25 Hydroxy    Vitamin B12 & Folate    Chronic fatigue    Relevant Orders    TSH    T4, Free    T3, Free    Comprehensive Metabolic Panel    Hemoglobin A1c    Lipid Panel    Vitamin D 25 Hydroxy    Vitamin B12 & Folate      Other Visit Diagnoses     Primary hypothyroidism    -  Primary    Relevant Orders    TSH    T4, Free    T3, Free    Comprehensive Metabolic Panel    Hemoglobin A1c    Lipid Panel    Vitamin D 25 Hydroxy    Vitamin B12 & Folate        Hypothyroidism-chronic problem  Continue Synthroid 125 mcg oral daily and Cytomel 5 mcg oral daily.  Advised the patient to let us know immediately when she gets to know that she is pregnant.    Vitamin D deficiency  Continue vitamin D replacement 50,000 units q.  "weekly.    Patient was seen by [facility] nurse practitioner prior to me.  All the above problems are new for me    Interpreted the blood work-up/imaging results performed by the primary care/consulting physician -    Refills sent to pharmacy    Follow Up     Patient was given instructions and counseling regarding her condition or for health maintenance advice. Please see specific information pulled into the AVS if appropriate.       Thank you for asking me to see your patient, Vivian Martínez in consultation.         Abdoul Albert MD  12/14/21      EMR Dragon / transcription disclaimer:     \"Dictated utilizing Dragon dictation\".              "

## 2021-12-16 DIAGNOSIS — E55.9 VITAMIN D DEFICIENCY: ICD-10-CM

## 2021-12-17 RX ORDER — ERGOCALCIFEROL 1.25 MG/1
CAPSULE ORAL
Qty: 26 CAPSULE | Refills: 1 | Status: SHIPPED | OUTPATIENT
Start: 2021-12-17 | End: 2022-04-14 | Stop reason: SDUPTHER

## 2022-03-27 DIAGNOSIS — E03.9 PRIMARY HYPOTHYROIDISM: ICD-10-CM

## 2022-03-28 RX ORDER — LIOTHYRONINE SODIUM 5 UG/1
5 TABLET ORAL DAILY
Qty: 90 TABLET | Refills: 1 | Status: SHIPPED | OUTPATIENT
Start: 2022-03-28 | End: 2022-04-14 | Stop reason: SDUPTHER

## 2022-04-11 ENCOUNTER — TELEPHONE (OUTPATIENT)
Dept: ENDOCRINOLOGY | Age: 37
End: 2022-04-11

## 2022-04-14 ENCOUNTER — TELEPHONE (OUTPATIENT)
Dept: ENDOCRINOLOGY | Age: 37
End: 2022-04-14

## 2022-04-14 DIAGNOSIS — E03.9 PRIMARY HYPOTHYROIDISM: ICD-10-CM

## 2022-04-14 DIAGNOSIS — E55.9 VITAMIN D DEFICIENCY: ICD-10-CM

## 2022-04-14 DIAGNOSIS — E03.8 HYPOTHYROIDISM DUE TO HASHIMOTO'S THYROIDITIS: ICD-10-CM

## 2022-04-14 DIAGNOSIS — E06.3 HYPOTHYROIDISM DUE TO HASHIMOTO'S THYROIDITIS: ICD-10-CM

## 2022-04-14 RX ORDER — ERGOCALCIFEROL 1.25 MG/1
50000 CAPSULE ORAL 2 TIMES WEEKLY
Qty: 26 CAPSULE | Refills: 1 | Status: SHIPPED | OUTPATIENT
Start: 2022-04-14

## 2022-04-14 RX ORDER — LIOTHYRONINE SODIUM 5 UG/1
5 TABLET ORAL DAILY
Qty: 90 TABLET | Refills: 1 | Status: SHIPPED | OUTPATIENT
Start: 2022-04-14 | End: 2023-03-31 | Stop reason: SDUPTHER

## 2022-04-14 RX ORDER — LEVOTHYROXINE SODIUM 125 MCG
125 TABLET ORAL DAILY
Qty: 90 TABLET | Refills: 1 | Status: SHIPPED | OUTPATIENT
Start: 2022-04-14 | End: 2022-09-01 | Stop reason: SDUPTHER

## 2022-04-14 NOTE — TELEPHONE ENCOUNTER
Patient called needing refills   Synthroid 125 MCG tablet (03/26/2021)    liothyronine (CYTOMEL) 5 MCG tablet (03/28/2022)    vitamin D (ERGOCALCIFEROL) 1.25 MG (25410 UT) capsule capsule (12/17/2021)      [facility][id number] - [facility] [place] - [address] - [phone]  - [phone] FX   [address] [place] [address]   Phone: [phone] Fax: [phone]

## 2022-08-29 ENCOUNTER — TELEPHONE (OUTPATIENT)
Dept: ENDOCRINOLOGY | Age: 37
End: 2022-08-29

## 2022-08-31 ENCOUNTER — TELEPHONE (OUTPATIENT)
Dept: ENDOCRINOLOGY | Age: 37
End: 2022-08-31

## 2022-08-31 NOTE — TELEPHONE ENCOUNTER
PT CALLED IN AND SAID THAT THE SYNTHROID IS TOO EXPENSIVE AND IS ASKING FOR LEVOTHYROXINE INSTEAD. SHE IS COMPLETELY OUT OF MEDICATION AND NEEDS A REFILL.

## 2022-09-01 DIAGNOSIS — E06.3 HYPOTHYROIDISM DUE TO HASHIMOTO'S THYROIDITIS: ICD-10-CM

## 2022-09-01 DIAGNOSIS — E03.8 HYPOTHYROIDISM DUE TO HASHIMOTO'S THYROIDITIS: ICD-10-CM

## 2022-09-01 RX ORDER — LEVOTHYROXINE SODIUM 0.12 MG/1
125 TABLET ORAL DAILY
Qty: 90 TABLET | Refills: 1 | Status: SHIPPED | OUTPATIENT
Start: 2022-09-01 | End: 2022-09-02 | Stop reason: ALTCHOICE

## 2022-09-02 RX ORDER — LEVOTHYROXINE SODIUM 0.12 MG/1
125 TABLET ORAL DAILY
Qty: 90 TABLET | Refills: 1 | Status: SHIPPED | OUTPATIENT
Start: 2022-09-02

## 2022-09-02 NOTE — TELEPHONE ENCOUNTER
----- Message from Abdoul Albert MD sent at 9/1/2022  1:46 PM EDT -----  Regarding: FW: Generic medicine request      ----- Message -----  From: Roxanna Mckenzie  Sent: 9/1/2022  12:36 PM EDT  To: Abdoul Albert MD  Subject: FW: Generic medicine request                       ----- Message -----  From: Vivian Martínez  Sent: 9/1/2022  12:11 PM EDT  To: Sum[facility]  Subject: Generic medicine request                         Hello,    I'm not sure what else to do [facility] I called the office on Monday requesting a prescription for the generic med levothyroxine because synthroid has gone up in price for me. I've talked to 2 different people in the office saying they were going to see that the prescription would be called in and now it's Thursday and I still don't have the prescription. I know this isn't necessarily your (the Dr) fault, but the apparent administrative issues are frustrating nonetheless. I'm hoping this can be resolved today. Thank you

## 2022-12-13 ENCOUNTER — OFFICE VISIT (OUTPATIENT)
Dept: ENDOCRINOLOGY | Age: 37
End: 2022-12-13

## 2022-12-13 VITALS
TEMPERATURE: 96.9 F | BODY MASS INDEX: 42.27 KG/M2 | OXYGEN SATURATION: 98 % | HEART RATE: 87 BPM | SYSTOLIC BLOOD PRESSURE: 120 MMHG | WEIGHT: 247.6 LBS | HEIGHT: 64 IN | DIASTOLIC BLOOD PRESSURE: 82 MMHG

## 2022-12-13 DIAGNOSIS — E55.9 VITAMIN D DEFICIENCY: ICD-10-CM

## 2022-12-13 DIAGNOSIS — E03.8 HYPOTHYROIDISM DUE TO HASHIMOTO'S THYROIDITIS: Primary | ICD-10-CM

## 2022-12-13 DIAGNOSIS — E06.3 HYPOTHYROIDISM DUE TO HASHIMOTO'S THYROIDITIS: Primary | ICD-10-CM

## 2022-12-13 PROCEDURE — 99214 OFFICE O/P EST MOD 30 MIN: CPT | Performed by: INTERNAL MEDICINE

## 2022-12-13 RX ORDER — HYDROXYZINE HYDROCHLORIDE 25 MG/1
TABLET, FILM COATED ORAL
COMMUNITY
Start: 2022-11-26

## 2022-12-13 NOTE — PROGRESS NOTES
"Chief Complaint  Chief Complaint   Patient presents with   • Primary Hypothyroidism     Patient states no feeling of fatigue       Subjective          History of Present Illness    Vivian Martínez 37 y.o. presents  as a follow-up for evaluation of hypothyroidism and vitamin D deficiency       Today in clinic patient reports that she is on Levothyroxine 125 mcg oral daily and Cytomel 5 mcg oral daily.  She is compliant with the medication and takes it on empty stomach.     Energy levels are good, weight has been stable.  Improvement in her hair loss has been reported in today's visit.  No other significant hyper or hypothyroid symptoms.    Menses - regular, LMP - 2- 3 week back        Reviewed primary care physician's/consulting physician documentation and lab results         I have reviewed the patient's allergies, medicines, past medical hx, family hx and social hx in detail.    Objective   Vital Signs:   /82   Pulse 87   Temp 96.9 °F (36.1 °C) (Temporal)   Ht 162.6 cm (64\")   Wt 112 kg (247 lb 9.6 oz)   SpO2 98%   Breastfeeding No   BMI 42.50 kg/m²   Physical Exam   General appearance - no distress  Eyes- anicteric sclera  Ear nose and throat-external ears and nose normal.    Respiratory-normal chest on inspection.  No respiratory distress noted.  Skin-no rashes.  Neuro-alert and oriented x3            Result Review :   The following data was reviewed by: Abdoul Albert MD on 12/13/2022:  Results Encounter on 09/26/2021   Component Date Value Ref Range Status   • TSH 12/06/2021 1.670  0.270 - 4.200 uIU/mL Final   • Free T4 12/06/2021 1.40  0.93 - 1.70 ng/dL Final   • T3, Free 12/06/2021 2.72  2.00 - 4.40 pg/mL Final   • Glucose 12/06/2021 98  65 - 99 mg/dL Final   • BUN 12/06/2021 13  6 - 20 mg/dL Final   • Creatinine 12/06/2021 0.88  0.57 - 1.00 mg/dL Final   • Sodium 12/06/2021 138  136 - 145 mmol/L Final   • Potassium 12/06/2021 4.2  3.5 - 5.2 mmol/L Final   • Chloride 12/06/2021 100  98 - 107 " mmol/L Final   • CO2 12/06/2021 20.2 (L)  22.0 - 29.0 mmol/L Final   • Calcium 12/06/2021 9.8  8.6 - 10.5 mg/dL Final   • Total Protein 12/06/2021 7.9  6.0 - 8.5 g/dL Final   • Albumin 12/06/2021 4.70  3.50 - 5.20 g/dL Final   • ALT (SGPT) 12/06/2021 8  1 - 33 U/L Final   • AST (SGOT) 12/06/2021 11  1 - 32 U/L Final   • Alkaline Phosphatase 12/06/2021 102  39 - 117 U/L Final   • Total Bilirubin 12/06/2021 <0.2  0.0 - 1.2 mg/dL Final   • eGFR Non  Amer 12/06/2021 73  >60 mL/min/1.73 Final   • Globulin 12/06/2021 3.2  gm/dL Final   • A/G Ratio 12/06/2021 1.5  g/dL Final   • BUN/Creatinine Ratio 12/06/2021 14.8  7.0 - 25.0 Final   • Anion Gap 12/06/2021 17.8 (H)  5.0 - 15.0 mmol/L Final   • 25 Hydroxy, Vitamin D 12/06/2021 54.8  30.0 - 100.0 ng/ml Final     Data reviewed: PCP and endocrine notes       Results Review:    I reviewed the patient's new clinical results.     Assessment and Plan    Problem List Items Addressed This Visit        Other    Vitamin D deficiency    Relevant Orders    T4, Free    TSH    Basic Metabolic Panel    Hemoglobin A1c    Lipid Panel    Vitamin B12 & Folate    Vitamin D,25-Hydroxy    T3, Free    TSH    T3, Free    T4, Free    Basic Metabolic Panel    Hemoglobin A1c    Lipid Panel    Vitamin B12 & Folate    Vitamin D,25-Hydroxy    Hypothyroidism due to Hashimoto's thyroiditis - Primary    Relevant Orders    T4, Free    TSH    Basic Metabolic Panel    Hemoglobin A1c    Lipid Panel    Vitamin B12 & Folate    Vitamin D,25-Hydroxy    T3, Free    TSH    T3, Free    T4, Free    Basic Metabolic Panel    Hemoglobin A1c    Lipid Panel    Vitamin B12 & Folate    Vitamin D,25-Hydroxy     Hypothyroidism-chronic problem  Hypothyroidism continue levothyroxine 25 mcg oral daily  Continue Cytomel.      vitamin D deficiency  Continue vitamin D replacement.    Obesity  Discussed with the patient options of weight loss medications, patient most likely behavioral changes before we could consider any of  "the medications.    Interpreted the blood work-up/imaging results performed by the primary care/consulting physician -    Refills sent to pharmacy    Follow Up     Patient was given instructions and counseling regarding her condition or for health maintenance advice. Please see specific information pulled into the AVS if appropriate.       Thank you for asking me to see your patient, Vivian Martínez in consultation.         Abdoul Albert MD  12/13/22      EMR Dragon / transcription disclaimer:     \"Dictated utilizing Dragon dictation\".              "

## 2022-12-14 LAB
25(OH)D3+25(OH)D2 SERPL-MCNC: 61.9 NG/ML (ref 30–100)
BUN SERPL-MCNC: 13 MG/DL (ref 6–20)
BUN/CREAT SERPL: 12.3 (ref 7–25)
CALCIUM SERPL-MCNC: 9.4 MG/DL (ref 8.6–10.5)
CHLORIDE SERPL-SCNC: 106 MMOL/L (ref 98–107)
CHOLEST SERPL-MCNC: 175 MG/DL (ref 0–200)
CO2 SERPL-SCNC: 25.4 MMOL/L (ref 22–29)
CREAT SERPL-MCNC: 1.06 MG/DL (ref 0.57–1)
EGFRCR SERPLBLD CKD-EPI 2021: 69.5 ML/MIN/1.73
FOLATE SERPL-MCNC: 8.07 NG/ML (ref 4.78–24.2)
GLUCOSE SERPL-MCNC: 101 MG/DL (ref 65–99)
HBA1C MFR BLD: 5.8 % (ref 4.8–5.6)
HDLC SERPL-MCNC: 35 MG/DL (ref 40–60)
IMP & REVIEW OF LAB RESULTS: NORMAL
LDLC SERPL CALC-MCNC: 120 MG/DL (ref 0–100)
POTASSIUM SERPL-SCNC: 4.7 MMOL/L (ref 3.5–5.2)
SODIUM SERPL-SCNC: 143 MMOL/L (ref 136–145)
T3FREE SERPL-MCNC: 2.6 PG/ML (ref 2–4.4)
T4 FREE SERPL-MCNC: 1.28 NG/DL (ref 0.93–1.7)
TRIGL SERPL-MCNC: 106 MG/DL (ref 0–150)
TSH SERPL DL<=0.005 MIU/L-ACNC: 2.55 UIU/ML (ref 0.27–4.2)
VIT B12 SERPL-MCNC: 233 PG/ML (ref 211–946)
VLDLC SERPL CALC-MCNC: 20 MG/DL (ref 5–40)

## 2023-03-31 DIAGNOSIS — E03.9 PRIMARY HYPOTHYROIDISM: ICD-10-CM

## 2023-03-31 DIAGNOSIS — E55.9 VITAMIN D DEFICIENCY: ICD-10-CM

## 2023-04-03 RX ORDER — ERGOCALCIFEROL 1.25 MG/1
50000 CAPSULE ORAL 2 TIMES WEEKLY
Qty: 26 CAPSULE | Refills: 1 | OUTPATIENT
Start: 2023-04-03

## 2023-04-03 RX ORDER — LIOTHYRONINE SODIUM 5 UG/1
5 TABLET ORAL DAILY
Qty: 30 TABLET | Refills: 0 | Status: SHIPPED | OUTPATIENT
Start: 2023-04-03

## 2023-04-04 ENCOUNTER — TELEPHONE (OUTPATIENT)
Dept: ENDOCRINOLOGY | Age: 38
End: 2023-04-04
Payer: COMMERCIAL

## 2023-04-04 NOTE — TELEPHONE ENCOUNTER
PT called wanting to know why her Vit D was denied. She has an appointment set for may.    Please call her back at [phone]

## 2023-05-06 DIAGNOSIS — E03.9 PRIMARY HYPOTHYROIDISM: ICD-10-CM

## 2023-05-09 RX ORDER — LIOTHYRONINE SODIUM 5 UG/1
5 TABLET ORAL DAILY
Qty: 30 TABLET | Refills: 0 | Status: SHIPPED | OUTPATIENT
Start: 2023-05-09

## 2023-05-25 ENCOUNTER — OFFICE VISIT (OUTPATIENT)
Dept: ENDOCRINOLOGY | Age: 38
End: 2023-05-25
Payer: COMMERCIAL

## 2023-05-25 VITALS
HEIGHT: 64 IN | HEART RATE: 104 BPM | OXYGEN SATURATION: 97 % | WEIGHT: 247.4 LBS | SYSTOLIC BLOOD PRESSURE: 122 MMHG | BODY MASS INDEX: 42.24 KG/M2 | DIASTOLIC BLOOD PRESSURE: 64 MMHG | TEMPERATURE: 97.5 F

## 2023-05-25 DIAGNOSIS — E03.9 PRIMARY HYPOTHYROIDISM: ICD-10-CM

## 2023-05-25 DIAGNOSIS — E03.8 HYPOTHYROIDISM DUE TO HASHIMOTO'S THYROIDITIS: Primary | ICD-10-CM

## 2023-05-25 DIAGNOSIS — E55.9 VITAMIN D DEFICIENCY: ICD-10-CM

## 2023-05-25 DIAGNOSIS — E06.3 HYPOTHYROIDISM DUE TO HASHIMOTO'S THYROIDITIS: Primary | ICD-10-CM

## 2023-05-25 PROCEDURE — 99213 OFFICE O/P EST LOW 20 MIN: CPT | Performed by: NURSE PRACTITIONER

## 2023-05-25 RX ORDER — LIOTHYRONINE SODIUM 5 UG/1
5 TABLET ORAL DAILY
Qty: 90 TABLET | Refills: 3 | Status: SHIPPED | OUTPATIENT
Start: 2023-05-25

## 2023-05-25 RX ORDER — LEVOTHYROXINE SODIUM 0.12 MG/1
125 TABLET ORAL DAILY
Qty: 90 TABLET | Refills: 3 | Status: SHIPPED | OUTPATIENT
Start: 2023-05-25

## 2023-05-25 NOTE — PROGRESS NOTES
"Chief Complaint  Hypothyroidism and Hashimoto's Thyroiditis    Subjective        Vivian Martínez presents to [facility] ENDOCRINOLOGY  History of Present Illness     Hypothyroid diagnosed around 2008  + Hashimotos  Feeling achy in her joints x 1 month  Vitamin d was change to OTC D3   occasional jitteriness with caffeine  Uses hydroxyzine PRN for sleep  Anxiety with moms health   Normal BM     Currently on levothyroxine 125mcg daily and cytomel 5mcg daily     Objective   Vital Signs:  /64   Pulse 104   Temp 97.5 °F (36.4 °C) (Temporal)   Ht 162.6 cm (64.02\")   Wt 112 kg (247 lb 6.4 oz)   SpO2 97%   BMI 42.44 kg/m²   Estimated body mass index is 42.44 kg/m² as calculated from the following:    Height as of this encounter: 162.6 cm (64.02\").    Weight as of this encounter: 112 kg (247 lb 6.4 oz).             Physical Exam  Vitals reviewed.   Constitutional:       General: She is not in acute distress.  HENT:      Head: Normocephalic and atraumatic.   Cardiovascular:      Rate and Rhythm: Normal rate.   Pulmonary:      Effort: Pulmonary effort is normal. No respiratory distress.   Musculoskeletal:         General: No signs of injury. Normal range of motion.      Cervical back: Normal range of motion and neck supple.   Skin:     General: Skin is warm and dry.   Neurological:      Mental Status: She is alert and oriented to person, place, and time. Mental status is at baseline.   Psychiatric:         Mood and Affect: Mood normal.         Behavior: Behavior normal.         Thought Content: Thought content normal.         Judgment: Judgment normal.          Result Review :  The following data was reviewed by: JOSESITO Aguirre on 05/25/2023:  Common labs        12/13/2022    09:33   Common Labs   Glucose 101     BUN 13     Creatinine 1.06     Sodium 143     Potassium 4.7     Chloride 106     Calcium 9.4     Total Cholesterol 175     Triglycerides 106     HDL Cholesterol 35     LDL Cholesterol "  120     Hemoglobin A1C 5.80                    Assessment and Plan   Diagnoses and all orders for this visit:    1. Hypothyroidism due to Hashimoto's thyroiditis (Primary)  -     TSH; Future  -     T4, Free; Future  -     T3, Free; Future  -     Vitamin D,25-Hydroxy; Future  -     Basic Metabolic Panel; Future    2. Vitamin D deficiency  -     TSH; Future  -     T4, Free; Future  -     T3, Free; Future  -     Vitamin D,25-Hydroxy; Future  -     Basic Metabolic Panel; Future    3. Primary hypothyroidism  -     liothyronine (CYTOMEL) 5 MCG tablet; Take 1 tablet by mouth Daily.  Dispense: 90 tablet; Refill: 3    Other orders  -     levothyroxine (SYNTHROID, LEVOTHROID) 125 MCG tablet; Take 1 tablet by mouth Daily.  Dispense: 90 tablet; Refill: 3             Follow Up   Return in about 1 year (around 5/25/2024).   Labs at [facility] in the next few week   Will adjust above regimen if needed based on labs     Patient was given instructions and counseling regarding her condition or for health maintenance advice. Please see specific information pulled into the AVS if appropriate.     JOSESITO Aguirre

## 2023-08-30 RX ORDER — LEVOTHYROXINE SODIUM 0.12 MG/1
125 TABLET ORAL DAILY
Qty: 30 TABLET | Refills: 0 | Status: SHIPPED | OUTPATIENT
Start: 2023-08-30

## 2023-09-05 ENCOUNTER — LAB (OUTPATIENT)
Facility: HOSPITAL | Age: 38
End: 2023-09-05
Payer: COMMERCIAL

## 2023-09-05 DIAGNOSIS — E03.8 HYPOTHYROIDISM DUE TO HASHIMOTO'S THYROIDITIS: ICD-10-CM

## 2023-09-05 DIAGNOSIS — E55.9 VITAMIN D DEFICIENCY: ICD-10-CM

## 2023-09-05 DIAGNOSIS — E06.3 HYPOTHYROIDISM DUE TO HASHIMOTO'S THYROIDITIS: ICD-10-CM

## 2023-09-05 LAB
25(OH)D3 SERPL-MCNC: 26.3 NG/ML (ref 30–100)
ANION GAP SERPL CALCULATED.3IONS-SCNC: 12.1 MMOL/L (ref 5–15)
BUN SERPL-MCNC: 12 MG/DL (ref 6–20)
BUN/CREAT SERPL: 12.6 (ref 7–25)
CALCIUM SPEC-SCNC: 9.2 MG/DL (ref 8.6–10.5)
CHLORIDE SERPL-SCNC: 105 MMOL/L (ref 98–107)
CO2 SERPL-SCNC: 22.9 MMOL/L (ref 22–29)
CREAT SERPL-MCNC: 0.95 MG/DL (ref 0.57–1)
EGFRCR SERPLBLD CKD-EPI 2021: 78.8 ML/MIN/1.73
GLUCOSE SERPL-MCNC: 85 MG/DL (ref 65–99)
POTASSIUM SERPL-SCNC: 4.3 MMOL/L (ref 3.5–5.2)
SODIUM SERPL-SCNC: 140 MMOL/L (ref 136–145)
T3FREE SERPL-MCNC: 2.59 PG/ML (ref 2–4.4)
T4 FREE SERPL-MCNC: 1.18 NG/DL (ref 0.93–1.7)
TSH SERPL DL<=0.05 MIU/L-ACNC: 3.59 UIU/ML (ref 0.27–4.2)

## 2023-09-05 PROCEDURE — 84439 ASSAY OF FREE THYROXINE: CPT

## 2023-09-05 PROCEDURE — 80048 BASIC METABOLIC PNL TOTAL CA: CPT

## 2023-09-05 PROCEDURE — 82306 VITAMIN D 25 HYDROXY: CPT

## 2023-09-05 PROCEDURE — 36415 COLL VENOUS BLD VENIPUNCTURE: CPT

## 2023-09-05 PROCEDURE — 84481 FREE ASSAY (FT-3): CPT

## 2023-09-05 PROCEDURE — 84443 ASSAY THYROID STIM HORMONE: CPT

## 2024-03-26 ENCOUNTER — OFFICE VISIT (OUTPATIENT)
Dept: ENDOCRINOLOGY | Age: 39
End: 2024-03-26
Payer: COMMERCIAL

## 2024-03-26 VITALS
SYSTOLIC BLOOD PRESSURE: 106 MMHG | OXYGEN SATURATION: 96 % | HEIGHT: 64 IN | BODY MASS INDEX: 42 KG/M2 | DIASTOLIC BLOOD PRESSURE: 72 MMHG | TEMPERATURE: 97.9 F | HEART RATE: 91 BPM | WEIGHT: 246 LBS

## 2024-03-26 DIAGNOSIS — E03.8 HYPOTHYROIDISM DUE TO HASHIMOTO'S THYROIDITIS: Primary | ICD-10-CM

## 2024-03-26 DIAGNOSIS — E06.3 HYPOTHYROIDISM DUE TO HASHIMOTO'S THYROIDITIS: Primary | ICD-10-CM

## 2024-03-26 DIAGNOSIS — E55.9 VITAMIN D DEFICIENCY: ICD-10-CM

## 2024-03-26 DIAGNOSIS — E66.01 CLASS 3 SEVERE OBESITY DUE TO EXCESS CALORIES WITHOUT SERIOUS COMORBIDITY WITH BODY MASS INDEX (BMI) OF 40.0 TO 44.9 IN ADULT: ICD-10-CM

## 2024-03-26 PROCEDURE — 99214 OFFICE O/P EST MOD 30 MIN: CPT | Performed by: NURSE PRACTITIONER

## 2024-03-26 NOTE — PROGRESS NOTES
"Chief Complaint  Hypothyroidism    Subjective        Vivian Martínez presents to [facility] ENDOCRINOLOGY  History of Present Illness    Hypothyroid diagnosed around 2008  + Hashimotos  Levothyroxine 125mcg daily and cytomel 5mcg daily in the morning with wellbutrin   Feels tired  Sleep \" is not great\"  She has a toddler at home     Objective   Vital Signs:  /72   Pulse 91   Temp 97.9 °F (36.6 °C) (Temporal)   Ht 162.6 cm (64.02\")   Wt 112 kg (246 lb)   SpO2 96%   BMI 42.20 kg/m²   Estimated body mass index is 42.2 kg/m² as calculated from the following:    Height as of this encounter: 162.6 cm (64.02\").    Weight as of this encounter: 112 kg (246 lb).             Physical Exam  Vitals reviewed.   Constitutional:       General: She is not in acute distress.  HENT:      Head: Normocephalic and atraumatic.   Cardiovascular:      Rate and Rhythm: Normal rate.   Pulmonary:      Effort: Pulmonary effort is normal. No respiratory distress.   Musculoskeletal:         General: No signs of injury. Normal range of motion.      Cervical back: Normal range of motion and neck supple.   Skin:     General: Skin is warm and dry.   Neurological:      Mental Status: She is alert and oriented to person, place, and time. Mental status is at baseline.   Psychiatric:         Mood and Affect: Mood normal.         Behavior: Behavior normal.         Thought Content: Thought content normal.         Judgment: Judgment normal.        Result Review :    The following data was reviewed by: JOSESITO Aguirre on 03/26/2024:  Common labs          9/5/2023    16:03   Common Labs   Glucose 85    BUN 12    Creatinine 0.95    Sodium 140    Potassium 4.3    Chloride 105    Calcium 9.2                   Assessment and Plan     Diagnoses and all orders for this visit:    1. Hypothyroidism due to Hashimoto's thyroiditis (Primary)  -     TSH  -     T4, Free  -     T3, Free  -     Vitamin D,25-Hydroxy    2. Class 3 severe " obesity due to excess calories without serious comorbidity with body mass index (BMI) of 40.0 to 44.9 in adult  -     Tirzepatide-Weight Management (ZEPBOUND) 2.5 MG/0.5ML solution auto-injector; Inject 0.5 mL under the skin into the appropriate area as directed 1 (One) Time Per Week.  Dispense: 2 mL; Refill: 0    3. Vitamin D deficiency             Follow Up     Return in about 1 year (around 3/26/2025).    Thyroid labs today  Consider zepbound for weight loss   Will adjust regimen further is needed  Continue daily vitamin d     Patient was given instructions and counseling regarding her condition or for health maintenance advice. Please see specific information pulled into the AVS if appropriate.       JOSESITO Aguirre

## 2024-03-27 ENCOUNTER — TELEPHONE (OUTPATIENT)
Dept: ENDOCRINOLOGY | Age: 39
End: 2024-03-27
Payer: COMMERCIAL

## 2024-03-27 DIAGNOSIS — E03.8 HYPOTHYROIDISM DUE TO HASHIMOTO'S THYROIDITIS: Primary | ICD-10-CM

## 2024-03-27 DIAGNOSIS — E06.3 HYPOTHYROIDISM DUE TO HASHIMOTO'S THYROIDITIS: Primary | ICD-10-CM

## 2024-03-27 LAB
25(OH)D3+25(OH)D2 SERPL-MCNC: 44.1 NG/ML (ref 30–100)
T3FREE SERPL-MCNC: 2.7 PG/ML (ref 2–4.4)
T4 FREE SERPL-MCNC: 1.63 NG/DL (ref 0.93–1.7)
TSH SERPL DL<=0.005 MIU/L-ACNC: 0.39 UIU/ML (ref 0.27–4.2)

## 2024-03-27 NOTE — TELEPHONE ENCOUNTER
Left pt a message to return call.    OKAY FOR HUB AND  TO READ RESULTS.  OKAY FOR  TO SCHEDULE LABS.     Labs are within range but did change over the last 6m  Please repeat labs in 4m, no additional changes needed at this time

## 2024-04-08 ENCOUNTER — PRIOR AUTHORIZATION (OUTPATIENT)
Dept: ENDOCRINOLOGY | Age: 39
End: 2024-04-08
Payer: COMMERCIAL

## 2024-04-10 NOTE — TELEPHONE ENCOUNTER
Left pt a detailed message that insurance will not cover that Zepbound. Advised to return call with any questions.

## 2024-06-17 DIAGNOSIS — E03.9 PRIMARY HYPOTHYROIDISM: ICD-10-CM

## 2024-06-17 RX ORDER — LIOTHYRONINE SODIUM 5 UG/1
5 TABLET ORAL DAILY
Qty: 90 TABLET | Refills: 0 | Status: SHIPPED | OUTPATIENT
Start: 2024-06-17

## 2024-06-17 NOTE — TELEPHONE ENCOUNTER
Rx Refill Note  Requested Prescriptions     Pending Prescriptions Disp Refills    liothyronine (CYTOMEL) 5 MCG tablet [Pharmacy Med Name: LIOTHYRONINE 5MCG TABLETS] 90 tablet 3     Sig: TAKE 1 TABLET BY MOUTH DAILY      Last office visit with prescribing clinician: 3/26/2024   Last telemedicine visit with prescribing clinician: Visit date not found   Next office visit with prescribing clinician: Visit date not found                         Would you like a call back once the refill request has been completed: [] Yes [] No    If the office needs to give you a call back, can they leave a voicemail: [] Yes [] No    Tiffanie Terrazas  06/17/24, 10:09 EDT

## 2024-06-22 DIAGNOSIS — E03.9 PRIMARY HYPOTHYROIDISM: ICD-10-CM

## 2024-06-24 RX ORDER — LIOTHYRONINE SODIUM 5 UG/1
5 TABLET ORAL DAILY
Qty: 90 TABLET | Refills: 0 | OUTPATIENT
Start: 2024-06-24

## 2024-06-24 NOTE — TELEPHONE ENCOUNTER
Rx Refill Note  Requested Prescriptions     Pending Prescriptions Disp Refills    liothyronine (CYTOMEL) 5 MCG tablet [Pharmacy Med Name: LIOTHYRONINE 5MCG TABLETS] 90 tablet 0     Sig: TAKE 1 TABLET BY MOUTH DAILY      Last office visit with prescribing clinician: 3/26/2024   Last telemedicine visit with prescribing clinician: Visit date not found   Next office visit with prescribing clinician: Visit date not found                         Would you like a call back once the refill request has been completed: [] Yes [] No    If the office needs to give you a call back, can they leave a voicemail: [] Yes [] No    Tiffanie Terrazas  06/24/24, 08:13 EDT

## 2024-07-01 RX ORDER — LEVOTHYROXINE SODIUM 0.12 MG/1
125 TABLET ORAL DAILY
Qty: 90 TABLET | Refills: 1 | Status: SHIPPED | OUTPATIENT
Start: 2024-07-01

## 2024-07-01 NOTE — TELEPHONE ENCOUNTER
Rx Refill Note  Requested Prescriptions     Pending Prescriptions Disp Refills    levothyroxine (SYNTHROID, LEVOTHROID) 125 MCG tablet [Pharmacy Med Name: LEVOTHYROXINE 0.125MG (125MCG) TAB] 30 tablet 0     Sig: TAKE 1 TABLET BY MOUTH DAILY      Last office visit with prescribing clinician: 3/26/2024   Last telemedicine visit with prescribing clinician: Visit date not found   Next office visit with prescribing clinician: Visit date not found                         Would you like a call back once the refill request has been completed: [] Yes [] No    If the office needs to give you a call back, can they leave a voicemail: [] Yes [] No    Coty Madrid MA  07/01/24, 08:19 EDT

## 2024-08-14 ENCOUNTER — TELEPHONE (OUTPATIENT)
Dept: ENDOCRINOLOGY | Age: 39
End: 2024-08-14
Payer: COMMERCIAL

## 2024-08-14 NOTE — TELEPHONE ENCOUNTER
----- Message from Adelita Freeman sent at 8/14/2024 12:50 PM EDT -----  Labs look good  Please arrange her march appt with me

## 2024-10-07 ENCOUNTER — TELEPHONE (OUTPATIENT)
Dept: ENDOCRINOLOGY | Age: 39
End: 2024-10-07
Payer: COMMERCIAL

## 2024-10-07 DIAGNOSIS — E03.9 PRIMARY HYPOTHYROIDISM: ICD-10-CM

## 2024-10-07 RX ORDER — LIOTHYRONINE SODIUM 5 UG/1
5 TABLET ORAL DAILY
Qty: 90 TABLET | Refills: 0 | Status: SHIPPED | OUTPATIENT
Start: 2024-10-07

## 2024-10-07 NOTE — TELEPHONE ENCOUNTER
Rx Refill Note  Requested Prescriptions     Pending Prescriptions Disp Refills    liothyronine (CYTOMEL) 5 MCG tablet 90 tablet 0     Sig: Take 1 tablet by mouth Daily.      Last office visit with prescribing clinician: 3/26/2024   Last telemedicine visit with prescribing clinician: Visit date not found   Next office visit with prescribing clinician: Visit date not found                         Would you like a call back once the refill request has been completed: [] Yes [] No    If the office needs to give you a call back, can they leave a voicemail: [] Yes [] No    Coty Madrid MA  10/07/24, 15:39 EDT

## 2024-12-27 DIAGNOSIS — E03.9 PRIMARY HYPOTHYROIDISM: ICD-10-CM

## 2024-12-27 RX ORDER — LIOTHYRONINE SODIUM 5 UG/1
5 TABLET ORAL DAILY
Qty: 90 TABLET | Refills: 0 | Status: SHIPPED | OUTPATIENT
Start: 2024-12-27

## 2024-12-27 RX ORDER — LEVOTHYROXINE SODIUM 125 UG/1
125 TABLET ORAL DAILY
Qty: 90 TABLET | Refills: 0 | Status: SHIPPED | OUTPATIENT
Start: 2024-12-27

## 2024-12-27 NOTE — TELEPHONE ENCOUNTER
Rx Refill Note  Requested Prescriptions     Pending Prescriptions Disp Refills    levothyroxine (SYNTHROID, LEVOTHROID) 125 MCG tablet [Pharmacy Med Name: LEVOTHYROXINE 0.125MG (125MCG) TAB] 90 tablet 1     Sig: TAKE 1 TABLET BY MOUTH DAILY    liothyronine (CYTOMEL) 5 MCG tablet [Pharmacy Med Name: LIOTHYRONINE 5MCG TABLETS] 90 tablet 0     Sig: TAKE 1 TABLET BY MOUTH DAILY      Last office visit with prescribing clinician: 3/26/2024   Last telemedicine visit with prescribing clinician: Visit date not found   Next office visit with prescribing clinician: 3/20/2025                         Would you like a call back once the refill request has been completed: [] Yes [] No    If the office needs to give you a call back, can they leave a voicemail: [] Yes [] No    Tiffanie Terrazas  12/27/24, 07:39 EST

## 2024-12-30 DIAGNOSIS — E03.9 PRIMARY HYPOTHYROIDISM: ICD-10-CM

## 2024-12-30 RX ORDER — LIOTHYRONINE SODIUM 5 UG/1
5 TABLET ORAL DAILY
Qty: 90 TABLET | Refills: 0 | OUTPATIENT
Start: 2024-12-30

## 2024-12-30 NOTE — TELEPHONE ENCOUNTER
Rx Refill Note  Requested Prescriptions     Pending Prescriptions Disp Refills    liothyronine (CYTOMEL) 5 MCG tablet [Pharmacy Med Name: LIOTHYRONINE 5MCG TABLETS] 90 tablet 0     Sig: TAKE 1 TABLET BY MOUTH DAILY      Last office visit with prescribing clinician: 3/26/2024   Last telemedicine visit with prescribing clinician: Visit date not found   Next office visit with prescribing clinician: 3/20/2025                         Would you like a call back once the refill request has been completed: [] Yes [] No    If the office needs to give you a call back, can they leave a voicemail: [] Yes [] No    Tiffanie Terrazas  12/30/24, 08:26 EST

## 2025-03-21 ENCOUNTER — OFFICE VISIT (OUTPATIENT)
Dept: ENDOCRINOLOGY | Age: 40
End: 2025-03-21
Payer: COMMERCIAL

## 2025-03-21 VITALS
HEART RATE: 72 BPM | HEIGHT: 64 IN | SYSTOLIC BLOOD PRESSURE: 124 MMHG | DIASTOLIC BLOOD PRESSURE: 70 MMHG | BODY MASS INDEX: 36.37 KG/M2 | WEIGHT: 213 LBS | OXYGEN SATURATION: 98 % | TEMPERATURE: 98.9 F

## 2025-03-21 DIAGNOSIS — E06.3 HYPOTHYROIDISM DUE TO HASHIMOTO'S THYROIDITIS: Primary | ICD-10-CM

## 2025-03-21 DIAGNOSIS — E66.01 CLASS 2 SEVERE OBESITY DUE TO EXCESS CALORIES WITH SERIOUS COMORBIDITY AND BODY MASS INDEX (BMI) OF 36.0 TO 36.9 IN ADULT: ICD-10-CM

## 2025-03-21 DIAGNOSIS — E66.812 CLASS 2 SEVERE OBESITY DUE TO EXCESS CALORIES WITH SERIOUS COMORBIDITY AND BODY MASS INDEX (BMI) OF 36.0 TO 36.9 IN ADULT: ICD-10-CM

## 2025-03-21 PROCEDURE — 99214 OFFICE O/P EST MOD 30 MIN: CPT | Performed by: NURSE PRACTITIONER

## 2025-03-21 NOTE — PROGRESS NOTES
"Chief Complaint  Hyperthyroidism and Hashimoto's Thyroiditis    Subjective        Vivian Martínez presents to [facility] ENDOCRINOLOGY  History of Present Illness    Hypothyroid diagnosed around 2008  + Hashimotos  Levothyroxine 125mcg daily and cytomel 5mcg daily in the morning with wellbutrin   Son is 4 yo, no plans for pregnancy  Zepbound was too expensive last year   Weight is down around 30 pounds over the last year, intentional with dietary changes, planning to start exercise   Energy levels are better with weight loss   Denies swallowing and voice changes   Times to arrange eye exam follow up   H/o anxiety, denies recent panic attacks, notices occasional flutters   Uses stool softeners for regular bowel movements     Objective   Vital Signs:  /70 (BP Location: Left arm, Patient Position: Sitting, Cuff Size: Adult)   Pulse 72   Temp 98.9 °F (37.2 °C) (Oral)   Ht 162.6 cm (64.02\")   Wt 96.6 kg (213 lb)   SpO2 98%   BMI 36.54 kg/m²   Estimated body mass index is 36.54 kg/m² as calculated from the following:    Height as of this encounter: 162.6 cm (64.02\").    Weight as of this encounter: 96.6 kg (213 lb).            Physical Exam  Vitals reviewed.   Constitutional:       General: She is not in acute distress.  HENT:      Head: Normocephalic and atraumatic.   Cardiovascular:      Rate and Rhythm: Normal rate.   Pulmonary:      Effort: Pulmonary effort is normal. No respiratory distress.   Musculoskeletal:         General: No signs of injury. Normal range of motion.      Cervical back: Normal range of motion and neck supple.   Skin:     General: Skin is warm and dry.   Neurological:      Mental Status: She is alert and oriented to person, place, and time. Mental status is at baseline.   Psychiatric:         Mood and Affect: Mood normal.         Behavior: Behavior normal.         Thought Content: Thought content normal.         Judgment: Judgment normal.        Result Review :  The " following data was reviewed by: JOSESITO Aguirre on 03/21/2025:              Assessment and Plan   Diagnoses and all orders for this visit:    1. Hypothyroidism due to Hashimoto's thyroiditis (Primary)  -     TSH  -     Comprehensive Metabolic Panel  -     Hemoglobin A1c  -     T3, Free  -     T4, Free  -     Lipid Panel    2. Class 2 severe obesity due to excess calories with serious comorbidity and body mass index (BMI) of 36.0 to 36.9 in adult  -     Tirzepatide-Weight Management (ZEPBOUND) 2.5 MG/0.5ML solution auto-injector; Inject 0.5 mL under the skin into the appropriate area as directed 1 (One) Time Per Week.  Dispense: 2 mL; Refill: 0  -     TSH  -     Comprehensive Metabolic Panel  -     Hemoglobin A1c  -     T3, Free  -     T4, Free  -     Lipid Panel             Follow Up   Return in about 1 year (around 3/21/2026).    Retry zepbound this year with new insurance plan   Reviewed back up contraception plan if starting zepbound   Labs today, additional recommendation to follow as needed     Patient was given instructions and counseling regarding her condition or for health maintenance advice. Please see specific information pulled into the AVS if appropriate.       JOSESITO Aguirre

## 2025-03-22 LAB
ALBUMIN SERPL-MCNC: 4.2 G/DL (ref 3.5–5.2)
ALBUMIN/GLOB SERPL: 1.6 G/DL
ALP SERPL-CCNC: 94 U/L (ref 39–117)
ALT SERPL-CCNC: 7 U/L (ref 1–33)
AST SERPL-CCNC: 8 U/L (ref 1–32)
BILIRUB SERPL-MCNC: 0.3 MG/DL (ref 0–1.2)
BUN SERPL-MCNC: 12 MG/DL (ref 6–20)
BUN/CREAT SERPL: 12.2 (ref 7–25)
CALCIUM SERPL-MCNC: 9.4 MG/DL (ref 8.6–10.5)
CHLORIDE SERPL-SCNC: 103 MMOL/L (ref 98–107)
CHOLEST SERPL-MCNC: 185 MG/DL (ref 0–200)
CO2 SERPL-SCNC: 26.6 MMOL/L (ref 22–29)
CREAT SERPL-MCNC: 0.98 MG/DL (ref 0.57–1)
EGFRCR SERPLBLD CKD-EPI 2021: 75.5 ML/MIN/1.73
GLOBULIN SER CALC-MCNC: 2.7 GM/DL
GLUCOSE SERPL-MCNC: 82 MG/DL (ref 65–99)
HBA1C MFR BLD: 5.1 % (ref 4.8–5.6)
HDLC SERPL-MCNC: 38 MG/DL (ref 40–60)
IMP & REVIEW OF LAB RESULTS: NORMAL
LDLC SERPL CALC-MCNC: 129 MG/DL (ref 0–100)
POTASSIUM SERPL-SCNC: 4.6 MMOL/L (ref 3.5–5.2)
PROT SERPL-MCNC: 6.9 G/DL (ref 6–8.5)
SODIUM SERPL-SCNC: 141 MMOL/L (ref 136–145)
T3FREE SERPL-MCNC: 3 PG/ML (ref 2–4.4)
T4 FREE SERPL-MCNC: 1.59 NG/DL (ref 0.92–1.68)
TRIGL SERPL-MCNC: 100 MG/DL (ref 0–150)
TSH SERPL DL<=0.005 MIU/L-ACNC: 0.22 UIU/ML (ref 0.27–4.2)
VLDLC SERPL CALC-MCNC: 18 MG/DL (ref 5–40)

## 2025-03-24 ENCOUNTER — DOCUMENTATION (OUTPATIENT)
Dept: ENDOCRINOLOGY | Age: 40
End: 2025-03-24
Payer: COMMERCIAL

## 2025-03-24 NOTE — PROGRESS NOTES
Spoke to patient regarding Zepbound is not covered by insurance and there is  no alternative. Informed she can call the insurance to see if [employer]. Pt v/u    Vivian saw her labs already and would like to know if she needs to make any adjustments to her thyroid medication.

## 2025-03-25 ENCOUNTER — RESULTS FOLLOW-UP (OUTPATIENT)
Dept: ENDOCRINOLOGY | Age: 40
End: 2025-03-25
Payer: COMMERCIAL

## 2025-03-25 DIAGNOSIS — E03.9 PRIMARY HYPOTHYROIDISM: ICD-10-CM

## 2025-04-03 RX ORDER — LIOTHYRONINE SODIUM 5 UG/1
5 TABLET ORAL DAILY
Qty: 90 TABLET | Refills: 1 | Status: SHIPPED | OUTPATIENT
Start: 2025-04-03

## 2025-04-03 RX ORDER — LEVOTHYROXINE SODIUM 125 UG/1
125 TABLET ORAL DAILY
Qty: 90 TABLET | Refills: 1 | Status: SHIPPED | OUTPATIENT
Start: 2025-04-03

## 2025-04-03 NOTE — TELEPHONE ENCOUNTER
Rx Refill Note  Requested Prescriptions     Pending Prescriptions Disp Refills    levothyroxine (SYNTHROID, LEVOTHROID) 125 MCG tablet 90 tablet 0     Sig: Take 1 tablet by mouth Daily.    liothyronine (CYTOMEL) 5 MCG tablet 90 tablet 0     Sig: Take 1 tablet by mouth Daily.      Last office visit with prescribing clinician: 3/21/2025   Last telemedicine visit with prescribing clinician: Visit date not found   Next office visit with prescribing clinician: 3/20/2026                         Would you like a call back once the refill request has been completed: [] Yes [] No    If the office needs to give you a call back, can they leave a voicemail: [] Yes [] No    Sujatha Acevedo MA  04/03/25, 08:01 EDT